# Patient Record
Sex: MALE | Race: WHITE | NOT HISPANIC OR LATINO | Employment: FULL TIME | URBAN - METROPOLITAN AREA
[De-identification: names, ages, dates, MRNs, and addresses within clinical notes are randomized per-mention and may not be internally consistent; named-entity substitution may affect disease eponyms.]

---

## 2019-03-06 ENCOUNTER — OFFICE VISIT - HEALTHEAST (OUTPATIENT)
Dept: FAMILY MEDICINE | Facility: CLINIC | Age: 44
End: 2019-03-06

## 2019-03-06 DIAGNOSIS — Z00.00 ROUTINE HISTORY AND PHYSICAL EXAMINATION OF ADULT: ICD-10-CM

## 2019-03-06 DIAGNOSIS — R40.1 DAZED STATE: ICD-10-CM

## 2019-03-06 DIAGNOSIS — M79.672 LEFT FOOT PAIN: ICD-10-CM

## 2019-03-06 LAB
ALBUMIN SERPL-MCNC: 4.4 G/DL (ref 3.5–5)
ALP SERPL-CCNC: 44 U/L (ref 45–120)
ALT SERPL W P-5'-P-CCNC: 34 U/L (ref 0–45)
ANION GAP SERPL CALCULATED.3IONS-SCNC: 8 MMOL/L (ref 5–18)
AST SERPL W P-5'-P-CCNC: 20 U/L (ref 0–40)
BASOPHILS # BLD AUTO: 0.1 THOU/UL (ref 0–0.2)
BASOPHILS NFR BLD AUTO: 1 % (ref 0–2)
BILIRUB SERPL-MCNC: 0.7 MG/DL (ref 0–1)
BUN SERPL-MCNC: 13 MG/DL (ref 8–22)
CALCIUM SERPL-MCNC: 10 MG/DL (ref 8.5–10.5)
CHLORIDE BLD-SCNC: 103 MMOL/L (ref 98–107)
CHOLEST SERPL-MCNC: 211 MG/DL
CO2 SERPL-SCNC: 29 MMOL/L (ref 22–31)
CREAT SERPL-MCNC: 1.08 MG/DL (ref 0.7–1.3)
EOSINOPHIL # BLD AUTO: 0.2 THOU/UL (ref 0–0.4)
EOSINOPHIL NFR BLD AUTO: 3 % (ref 0–6)
ERYTHROCYTE [DISTWIDTH] IN BLOOD BY AUTOMATED COUNT: 11 % (ref 11–14.5)
FASTING STATUS PATIENT QL REPORTED: NO
GFR SERPL CREATININE-BSD FRML MDRD: >60 ML/MIN/1.73M2
GLUCOSE BLD-MCNC: 89 MG/DL (ref 70–125)
HCT VFR BLD AUTO: 47.5 % (ref 40–54)
HDLC SERPL-MCNC: 45 MG/DL
HGB BLD-MCNC: 16.6 G/DL (ref 14–18)
LDLC SERPL CALC-MCNC: 138 MG/DL
LYMPHOCYTES # BLD AUTO: 2.7 THOU/UL (ref 0.8–4.4)
LYMPHOCYTES NFR BLD AUTO: 32 % (ref 20–40)
MCH RBC QN AUTO: 31.6 PG (ref 27–34)
MCHC RBC AUTO-ENTMCNC: 35 G/DL (ref 32–36)
MCV RBC AUTO: 90 FL (ref 80–100)
MONOCYTES # BLD AUTO: 0.4 THOU/UL (ref 0–0.9)
MONOCYTES NFR BLD AUTO: 5 % (ref 2–10)
NEUTROPHILS # BLD AUTO: 5 THOU/UL (ref 2–7.7)
NEUTROPHILS NFR BLD AUTO: 59 % (ref 50–70)
PLATELET # BLD AUTO: 313 THOU/UL (ref 140–440)
PMV BLD AUTO: 8.1 FL (ref 7–10)
POTASSIUM BLD-SCNC: 4.8 MMOL/L (ref 3.5–5)
PROT SERPL-MCNC: 7.3 G/DL (ref 6–8)
RBC # BLD AUTO: 5.25 MILL/UL (ref 4.4–6.2)
SODIUM SERPL-SCNC: 140 MMOL/L (ref 136–145)
TRIGL SERPL-MCNC: 141 MG/DL
WBC: 8.5 THOU/UL (ref 4–11)

## 2019-03-06 ASSESSMENT — MIFFLIN-ST. JEOR: SCORE: 1863.29

## 2019-03-11 ENCOUNTER — COMMUNICATION - HEALTHEAST (OUTPATIENT)
Dept: FAMILY MEDICINE | Facility: CLINIC | Age: 44
End: 2019-03-11

## 2020-05-21 ENCOUNTER — COMMUNICATION - HEALTHEAST (OUTPATIENT)
Dept: FAMILY MEDICINE | Facility: CLINIC | Age: 45
End: 2020-05-21

## 2020-05-21 ENCOUNTER — OFFICE VISIT - HEALTHEAST (OUTPATIENT)
Dept: FAMILY MEDICINE | Facility: CLINIC | Age: 45
End: 2020-05-21

## 2020-05-21 DIAGNOSIS — R51.9 FACIAL PAIN: ICD-10-CM

## 2020-05-21 RX ORDER — ACETAMINOPHEN 500 MG
500 TABLET ORAL EVERY 6 HOURS PRN
Status: SHIPPED | COMMUNITY
Start: 2020-05-21

## 2020-05-22 ENCOUNTER — OFFICE VISIT - HEALTHEAST (OUTPATIENT)
Dept: FAMILY MEDICINE | Facility: CLINIC | Age: 45
End: 2020-05-22

## 2020-05-22 DIAGNOSIS — R53.83 FATIGUE, UNSPECIFIED TYPE: ICD-10-CM

## 2020-05-22 DIAGNOSIS — J02.0 STREP THROAT: ICD-10-CM

## 2020-05-22 DIAGNOSIS — R59.1 LYMPHADENOPATHY: ICD-10-CM

## 2020-05-22 LAB
DEPRECATED S PYO AG THROAT QL EIA: ABNORMAL
MONOCYTES NFR BLD AUTO: NEGATIVE %

## 2020-05-22 ASSESSMENT — MIFFLIN-ST. JEOR: SCORE: 1905.02

## 2020-06-11 ENCOUNTER — COMMUNICATION - HEALTHEAST (OUTPATIENT)
Dept: SCHEDULING | Facility: CLINIC | Age: 45
End: 2020-06-11

## 2020-06-12 ENCOUNTER — OFFICE VISIT - HEALTHEAST (OUTPATIENT)
Dept: FAMILY MEDICINE | Facility: CLINIC | Age: 45
End: 2020-06-12

## 2020-06-12 DIAGNOSIS — R59.9 SWOLLEN LYMPH NODES: ICD-10-CM

## 2020-06-12 DIAGNOSIS — Z87.09 HISTORY OF STREP SORE THROAT: ICD-10-CM

## 2020-06-12 LAB — DEPRECATED S PYO AG THROAT QL EIA: NORMAL

## 2020-06-12 ASSESSMENT — MIFFLIN-ST. JEOR: SCORE: 1886.87

## 2020-06-13 LAB — GROUP A STREP BY PCR: NORMAL

## 2020-07-22 ENCOUNTER — OFFICE VISIT - HEALTHEAST (OUTPATIENT)
Dept: OTOLARYNGOLOGY | Facility: CLINIC | Age: 45
End: 2020-07-22

## 2020-07-22 DIAGNOSIS — K21.9 GASTROESOPHAGEAL REFLUX DISEASE WITHOUT ESOPHAGITIS: ICD-10-CM

## 2020-07-22 DIAGNOSIS — M26.623 BILATERAL TEMPOROMANDIBULAR JOINT PAIN: ICD-10-CM

## 2020-12-17 ENCOUNTER — COMMUNICATION - HEALTHEAST (OUTPATIENT)
Dept: SCHEDULING | Facility: CLINIC | Age: 45
End: 2020-12-17

## 2020-12-21 ENCOUNTER — OFFICE VISIT - HEALTHEAST (OUTPATIENT)
Dept: FAMILY MEDICINE | Facility: CLINIC | Age: 45
End: 2020-12-21

## 2020-12-21 DIAGNOSIS — R07.89 CHEST PRESSURE: ICD-10-CM

## 2020-12-21 DIAGNOSIS — Z13.220 LIPID SCREENING: ICD-10-CM

## 2020-12-21 DIAGNOSIS — Z13.1 SCREENING FOR DIABETES MELLITUS: ICD-10-CM

## 2020-12-21 DIAGNOSIS — R10.32 LLQ ABDOMINAL PAIN: ICD-10-CM

## 2020-12-21 DIAGNOSIS — R00.2 PALPITATIONS: ICD-10-CM

## 2020-12-21 DIAGNOSIS — K92.1 BLOOD IN STOOL: ICD-10-CM

## 2020-12-21 LAB
ALBUMIN SERPL-MCNC: 4.2 G/DL (ref 3.5–5)
ALP SERPL-CCNC: 42 U/L (ref 45–120)
ALT SERPL W P-5'-P-CCNC: 38 U/L (ref 0–45)
ANION GAP SERPL CALCULATED.3IONS-SCNC: 11 MMOL/L (ref 5–18)
AST SERPL W P-5'-P-CCNC: 20 U/L (ref 0–40)
BILIRUB SERPL-MCNC: 0.6 MG/DL (ref 0–1)
BUN SERPL-MCNC: 12 MG/DL (ref 8–22)
CALCIUM SERPL-MCNC: 9 MG/DL (ref 8.5–10.5)
CHLORIDE BLD-SCNC: 105 MMOL/L (ref 98–107)
CHOLEST SERPL-MCNC: 190 MG/DL
CO2 SERPL-SCNC: 25 MMOL/L (ref 22–31)
CREAT SERPL-MCNC: 1.08 MG/DL (ref 0.7–1.3)
ERYTHROCYTE [DISTWIDTH] IN BLOOD BY AUTOMATED COUNT: 12 % (ref 11–14.5)
FASTING STATUS PATIENT QL REPORTED: NO
GFR SERPL CREATININE-BSD FRML MDRD: >60 ML/MIN/1.73M2
GLUCOSE BLD-MCNC: 107 MG/DL (ref 70–125)
HBA1C MFR BLD: 4.8 %
HCT VFR BLD AUTO: 47.7 % (ref 40–54)
HDLC SERPL-MCNC: 41 MG/DL
HGB BLD-MCNC: 16.5 G/DL (ref 14–18)
LDLC SERPL CALC-MCNC: 128 MG/DL
MCH RBC QN AUTO: 32.3 PG (ref 27–34)
MCHC RBC AUTO-ENTMCNC: 34.6 G/DL (ref 32–36)
MCV RBC AUTO: 93 FL (ref 80–100)
PLATELET # BLD AUTO: 293 THOU/UL (ref 140–440)
PMV BLD AUTO: 8.3 FL (ref 7–10)
POTASSIUM BLD-SCNC: 4.6 MMOL/L (ref 3.5–5)
PROT SERPL-MCNC: 6.8 G/DL (ref 6–8)
RBC # BLD AUTO: 5.11 MILL/UL (ref 4.4–6.2)
SODIUM SERPL-SCNC: 141 MMOL/L (ref 136–145)
TRIGL SERPL-MCNC: 105 MG/DL
TSH SERPL DL<=0.005 MIU/L-ACNC: 0.7 UIU/ML (ref 0.3–5)
WBC: 6.8 THOU/UL (ref 4–11)

## 2020-12-24 ENCOUNTER — COMMUNICATION - HEALTHEAST (OUTPATIENT)
Dept: FAMILY MEDICINE | Facility: CLINIC | Age: 45
End: 2020-12-24

## 2020-12-28 ENCOUNTER — COMMUNICATION - HEALTHEAST (OUTPATIENT)
Dept: FAMILY MEDICINE | Facility: CLINIC | Age: 45
End: 2020-12-28

## 2021-01-11 ENCOUNTER — AMBULATORY - HEALTHEAST (OUTPATIENT)
Dept: LAB | Facility: CLINIC | Age: 46
End: 2021-01-11

## 2021-01-11 DIAGNOSIS — K92.1 BLOOD IN STOOL: ICD-10-CM

## 2021-01-11 LAB
HEMOCCULT SP1 STL QL: NEGATIVE
HEMOCCULT SP2 STL QL: NEGATIVE
HEMOCCULT SP3 STL QL: NEGATIVE

## 2021-01-12 ENCOUNTER — COMMUNICATION - HEALTHEAST (OUTPATIENT)
Dept: FAMILY MEDICINE | Facility: CLINIC | Age: 46
End: 2021-01-12

## 2021-01-13 ENCOUNTER — COMMUNICATION - HEALTHEAST (OUTPATIENT)
Dept: ADMINISTRATIVE | Facility: CLINIC | Age: 46
End: 2021-01-13

## 2021-01-25 ENCOUNTER — OFFICE VISIT - HEALTHEAST (OUTPATIENT)
Dept: FAMILY MEDICINE | Facility: CLINIC | Age: 46
End: 2021-01-25

## 2021-01-25 DIAGNOSIS — M79.10 MUSCLE PAIN: ICD-10-CM

## 2021-01-25 DIAGNOSIS — R10.9 LEFT FLANK PAIN: ICD-10-CM

## 2021-01-25 ASSESSMENT — MIFFLIN-ST. JEOR: SCORE: 1894.58

## 2021-06-02 ENCOUNTER — RECORDS - HEALTHEAST (OUTPATIENT)
Dept: ADMINISTRATIVE | Facility: CLINIC | Age: 46
End: 2021-06-02

## 2021-06-02 VITALS — HEIGHT: 71 IN | BODY MASS INDEX: 29.82 KG/M2 | WEIGHT: 213 LBS

## 2021-06-04 VITALS
HEART RATE: 64 BPM | HEIGHT: 72 IN | SYSTOLIC BLOOD PRESSURE: 140 MMHG | DIASTOLIC BLOOD PRESSURE: 82 MMHG | WEIGHT: 218.7 LBS | BODY MASS INDEX: 29.62 KG/M2

## 2021-06-04 VITALS
BODY MASS INDEX: 29.08 KG/M2 | WEIGHT: 214.7 LBS | HEIGHT: 72 IN | SYSTOLIC BLOOD PRESSURE: 134 MMHG | RESPIRATION RATE: 12 BRPM | DIASTOLIC BLOOD PRESSURE: 94 MMHG | TEMPERATURE: 98.5 F | HEART RATE: 68 BPM

## 2021-06-04 VITALS — BODY MASS INDEX: 30.27 KG/M2 | WEIGHT: 217 LBS

## 2021-06-05 VITALS
WEIGHT: 216.4 LBS | HEIGHT: 72 IN | HEART RATE: 78 BPM | BODY MASS INDEX: 29.31 KG/M2 | SYSTOLIC BLOOD PRESSURE: 122 MMHG | OXYGEN SATURATION: 97 % | DIASTOLIC BLOOD PRESSURE: 80 MMHG

## 2021-06-05 VITALS
DIASTOLIC BLOOD PRESSURE: 89 MMHG | RESPIRATION RATE: 20 BRPM | SYSTOLIC BLOOD PRESSURE: 137 MMHG | HEART RATE: 98 BPM | BODY MASS INDEX: 30.31 KG/M2 | WEIGHT: 223.5 LBS

## 2021-06-08 NOTE — PROGRESS NOTES
Assessment / Impression     1. Swollen lymph nodes  Rapid Strep A Screen- Throat Swab    Group A Strep, RNA Direct Detection, Throat    cefdinir (OMNICEF) 300 MG capsule   2. History of strep sore throat           Plan:     We checked a rapid strep test which was negative.  This will be sent for culture given his ongoing symptoms of swollen lymph nodes with a sore throat recommended treatment with cefdinir.  He will work on getting plenty of rest.  He will return to the clinic if symptoms do not resolve.    Subjective:      HPI: Osei Rain is a 45 y.o. male who presents to the clinic to be evaluated for ongoing symptoms of swollen lymph nodes.  He was initially seen for this on 5/22/2020 and his strep test results came back positive.  The mono test was negative.  He was treated with a 10-day course of Augmentin which he reports completing.  He also bought a new toothbrush.  He states that the lymph nodes never completely improved.  He still has occasional sore throat.  He denies having difficulty swallowing.  He denies having fevers, although he feels warm at times.  He denies feeling congested or having coughing symptoms.      Review of Systems  Pertinent items are noted in HPI.       Objective:     BP (!) 134/94 (Patient Site: Right Arm, Patient Position: Sitting, Cuff Size: Adult Regular)   Pulse 68   Temp 98.5  F (36.9  C) (Oral)   Resp 12   Ht 6' (1.829 m)   Wt 214 lb 11.2 oz (97.4 kg)   BMI 29.12 kg/m      General Appearance: Alert, cooperative, appears slightly fatigued.  Head: Normocephalic, without obvious abnormality, atraumatic.  Eyes: PERRL, conjunctiva/corneas clear, EOM's intact.  Throat: Slightly erythematous. No exudates.  Neck: Supple, symmetrical, trachea midline.  There are palpable lymph nodes that are mildly tender to palpation in the bilateral anterior chain.  Lungs: Clear to auscultation bilaterally, respirations unlabored.  Heart:: Regular rate and rhythm.  Extremities: Extremities  normal, atraumatic, no cyanosis or edema.        Recent Results (from the past 168 hour(s))   Rapid Strep A Screen- Throat Swab    Specimen: Throat   Result Value Ref Range    Rapid Strep A Antigen No Group A Strep detected, presumptive negative No Group A Strep detected, presumptive negative

## 2021-06-08 NOTE — TELEPHONE ENCOUNTER
Who is calling:  patient  Reason for Call:  Had a virtual visit today and was waiting on a call back so he can schedule a face to face appointment. Not sure if he was to come in today or at a later time, please advise.  Date of last appointment with primary care: 03/06/19  Okay to leave a detailed message: Yes

## 2021-06-08 NOTE — TELEPHONE ENCOUNTER
COVID 19 Nurse Triage Plan/Patient Instructions    Please be aware that novel coronavirus (COVID-19) may be circulating in the community. If you develop symptoms such as fever, cough, or SOB or if you have concerns about the presence of another infection including coronavirus (COVID-19), please contact your health care provider or visit [email or web address].     Disposition/Instructions    Patient to schedule an In Person Visit with provider. Reference Visit Selection Guide.    Thank you for taking steps to prevent the spread of this virus.  o Limit your contact with others.  o Wear a simple mask to cover your cough.  o Wash your hands well and often.    Resources    [facility]: About COVID-19: [email or web address]    CDC: What to Do If You're Sick: www.cdc.gov/coronavirus/2019-ncov/about/steps-when-sick.html    CDC: Ending Home Isolation: www.cdc.gov/coronavirus/2019-ncov/hcp/disposition-in-home-patients.html     CDC: Caring for Someone: www.cdc.gov/coronavirus/2019-ncov/if-you-are-sick/care-for-someone.html     [facility]: Interim Guidance for Hospital Discharge to Home: www.[email or web address]/coronavirus/hcp/hospdischarge.pdf    [facility] clinical trials (COVID-19 research studies): [email or web address]-clinical-trials     Below are the COVID-19 hotlines at the [place] [facility] ([facility]). Interpreters are available.   o For health questions: Call 935-516-7817 or 1-892.783.4807 (7 a.m. to 7 p.m.)  o For questions about schools and childcare: Call 833-002-4834 or 1-184.816.1033 (7 a.m. to 7 p.m.)   RN triage   Call from pt   Pt states he was seen a couple of weeks ago -- swollen nodes -- and was dx w/ strep throat -- pt states he did not have sore throat   Pt states he completed medication   Pt states he still has swollen neck nodes and they still hurt -- and sometimes has ear pain off and on   No sore throat  No other nodes swollen per pt --   No fever   Swallowing and  breathing OK   Pt does not know if nodes are red   Pt states nodes vary in size and which side is swollen l L vs R / both]   Reviewed home care advice   Transferred to    Pt requesting in person visit   Christie Esparza RN [facility] RN triage      Reason for Disposition    Patient wants to be seen    Large node present > 2 weeks    Additional Information    Negative: Sounds like a life-threatening emergency to the triager    Negative: Sore throat is the main symptom and has swollen node in the neck that is < 1 inch (2.5 cm) in size    Negative: Node is in the neck and causes difficulty breathing    Negative: Patient sounds very sick or weak to the triager    Negative: Node is in the neck and can't swallow fluids    Negative: Fever > 103 F (39.4 C)    Negative: Lump or swelling in groin and pulsating (like heartbeat)    Protocols used: LYMPH NODES - [id number]

## 2021-06-08 NOTE — PROGRESS NOTES
"Osei Rain is a 45 y.o. male who is being evaluated via a billable video visit.      The patient has been notified of following:     \"This video visit will be conducted via a call between you and your physician/provider. We have found that certain health care needs can be provided without the need for an in-person physical exam.  This service lets us provide the care you need with a video conversation.  If a prescription is necessary we can send it directly to your pharmacy.  If lab work is needed we can place an order for that and you can then stop by our lab to have the test done at a later time.    Video visits are billed at different rates depending on your insurance coverage. Please reach out to your insurance provider with any questions.    If during the course of the call the physician/provider feels a video visit is not appropriate, you will not be charged for this service.\"    Patient has given verbal consent to a Video visit? Yes    Patient would like to receive their AVS by AVS Preference: Mail a copy.    Patient would like the video invitation sent by: Send to e-mail at: [email or web address]    Will anyone else be joining your video visit? No        Video Start Time: 8:30 AM    Assessment/Plan:        1. Facial pain  Exam findings were discussed and unable to fully examine via the video.  Patient is advised for face-to-face evaluation    Patient understanding and agreeable to plan of care        Subjective:    Patient ID:   Osei Rain is a 45 y.o. male video calling and presenting with having left-sided jaw/facial pain progressively worsening over the past 5 days.  He states that it feels as if there is something inside the mouth or behind the tongue somewhere.    He denies any fevers chills, sore throat or having any ear pain    Review of Systems  Allergy: reviewed  General : negative  A complete 5 point review of systems was obtained and is negative other than what is stated in the HPI.     The " following patient's history were reviewed and updated as appropriate:   He  has no past medical history on file..      Outpatient Encounter Medications as of 5/21/2020   Medication Sig Dispense Refill     acetaminophen (TYLENOL) 500 MG tablet Take 500 mg by mouth every 6 (six) hours as needed for pain.       No facility-administered encounter medications on file as of 5/21/2020.          Objective:   Wt 217 lb (98.4 kg) Comment: pt reported  BMI 30.27 kg/m        Physical Exam  General exam: Well-hydrated in no apparent distress  Facial exam normal to view with no apparent asymmetry  No facial skin rash or apparent swelling      Video-Visit Details    Type of service:  Video Visit    Video End Time (time video stopped): 8:41 AM  Originating Location (pt. Location): Home    Distant Location (provider location):  [facility]     Platform used for Video Visit: Deacon Schultz MD

## 2021-06-09 NOTE — PROGRESS NOTES
"HISTORY OF PRESENT ILLNESS  Asked to see by Dr. Schultz for evaluation of ear pain. Patient reports that he is here to inquire about \"things that I am feeling.\" Has pain in both jaws that goes into the ear. Left is worse than the right. For a while he felt that his lymph nodes might be contributing to the problems. He had several courses of antibiotics but the pain in th ear didn't go away. He also feels a sensation that is hard to explain. He has a sensation that is an irritant in the back of the throat. His symptoms seems to worsen with prolonged speaking. He has a metallic taste in the mouth that seems to be worse on the left side than the right side. He reports that the dentist explained that his neck seemed full. Symptoms seem to come and go. Also using Tums regularly to help with reflux.     REVIEW OF SYSTEMS  Review of Systems: a 10-system review was performed. Pertinent positives are noted in the HPI and on a separate scanned document in the chart.    PMH, PSH, FH and SH has documented in the EHR.      EXAM    CONSTITUTIONAL  General Appearance:  Normal, well developed, well nourished, no obvious distress  Ability to Communicate:  communicates appropriately.  TMJ: Pain to palpation bilateral TMJs with crepitance in right.    HEAD AND FACE  Appearance and Symmetry:  Normal, no scalp or facial scarring or suspicious lesions.    EARS  Clinical speech reception threshold:  Normal, able to hear normal speech.  Auricle:  Normal, Auricles without scars, lesions, masses.  External auditory canal:  Normal, External auditory canal normal.  Tympanic membrane:  Normal, Tympanic membranes normal without swelling or erythema.    NOSE (speculum or scope)  Architecture:  Normal, Grossly normal external nasal architecture with no masses or lesions.  Mucosa:  Normal mucosa, No polyps or masses.  Septum:  Normal, Septum non-obstructing.  Turbinates:  Normal, No turbinate abnormalities    ORAL CAVITY AND OROPHARYNX  Lips:  " Normal.  Dental and gingiva:  Normal, No obvious dental or gingival disease.  Mucosa:  Normal, Moist mucous membranes.  Tongue:  Normal, Tongue mobile with no mucosal abnormalities  Hard and soft palate:  Normal, Hard and soft palate without cleft or mucosal lesions.  Oral pharynx:  Normal, Posterior pharynx without lesions or remarkable asymmetry.  Saliva:  Normal, Clear saliva.  Masses:  Normal, No palpable masses or pathologically enlarged lymph nodes.    NECK  Masses/lymph nodes:  Normal, No worrisome neck masses or lymph nodes.  Salivary glands:  Normal, Parotid and submandibular glands.  Trachea and larynx position:  Normal, Trachea and larynx midline.  Thyroid:  Normal, No thyroid abnormality.  Tenderness:  Normal, No cervical tenderness.  Suppleness:  Normal, Neck supple    NEUROLOGICAL  Speech pattern:  Normal, Proasaic    RESPIRATORY  Symmetry and Respiratory effort:  Normal, Symmetric chest movement and expansion with no increased intercostal retractions or use of accessory muscles.     IMPRESSION  1. TMJ arthralgia  2. GERD by history.    RECOMMENDATION  1. Conservative management with NSAIDS, heating pad and massage, which he has been doing and it helps.  2. Consider omeprazole.    Rolly Brar MD

## 2021-06-13 NOTE — PROGRESS NOTES
Progress Note     ASSESSMENT/PLAN:    1. Chest pressure  Thyroid Stimulating Hormone (TSH)   2. Palpitations  Thyroid Stimulating Hormone (TSH)   3. LLQ abdominal pain  Comprehensive Metabolic Panel    XR Abdomen AP    XR Abdomen AP   4. Blood in stool  HM2(CBC w/o Differential)    Occult Blood, Fecal   5. Lipid screening  Lipid Profile   6. Screening for diabetes mellitus  Glycosylated Hemoglobin A1c       Chest pressure  Palpitations  LLQ abdominal pain  Blood in stool  On exam, no red flag sxs. His vitals are reassuring in the office and he looks well. No hemorrhoids identified on exam. No colonoscopy. Patient did have initial low grade fever x 24 hours but no repeat. Unclear exact etiology of the pain. Ddx: UTI vs nephrolithiasis vs diverticulosis vs diverticulitis vs internal hemorrhoids vs constipation vs ischemic pain( less likely).. Give no fevers, less likely bacterial. No urinary sxs or hematuria making renal stones or UTI less likely. Patient does have palpitations and left sided chest pain with his sxs. Will get TSH. Chest pain most likely 2/2 GERD - stress test in 2013 was normal per patient. Will get CMP, CBC , FOBT and KUB. If no significant constipation on KUB and persistent sxs, would get CT A/P. No colonsocopy on file.   - Thyroid Stimulating Hormone (TSH)  - Comprehensive Metabolic Panel  - XR Abdomen AP  - HM2(CBC w/o Differential)  - Occult Blood, Fecal; Future    Lipid screening  - Lipid Profile    Screening for diabetes mellitus  - Glycosylated Hemoglobin A1c      There are no discontinued medications.        Return in about 1 month (around 1/21/2021) for Belly pain .    CHIEF COMPLAINT:  Chief Complaint   Patient presents with     Flank Pain     left abdominal from the front to the back then up to the chest, intermitten pain, x couple weeks, pt did test for COVID was negative        HISTORY OF PRESENT ILLNESS:  Osei Rain is a 45 y.o. male w/ no significant PMHx  Who presents today for  persistent LLQ pain:     Patient is coming in for intermittent LLQ that started about 6 weeks ago .Initially, patient had a low grade fevers of about a 100 x 24 hours when it started . The subjective fevers went away but the LLQ pain stayed. The pain comes and go. Is achy, no necessarily a sharp stabbing pain. No urinary sxs, flank pain or gross hematuria. No significant weight loss or changes in bowel habits. Sometime the belly pain will wake him up at night and feel achy. Is constipated at baseline and has continued to be so. No bloody bowl movement. (+) small amount of blood on toilet paper when he wipes. Has been eating more granola and 'bland diet' to see if it will help the belly pain - it hasn't helped. Reflux sxs have been worse - increased pain with eating. Has some mild associated left sided chest pain with no shortness of breath or orthopnea. Has Prilosec at home. No hx of hemorrhoids and no increased flatulence. No nausea or vomiting with the abdominal pain. No new supplements or changes in meds. Patient does report intermittent palpitations noted on his smart watch that last about 5 mins Mild lightheadedness with the palpitations       REVIEW OF SYSTEMS:   All other systems are negative.      TOBACCO USE:  Social History     Tobacco Use   Smoking Status Former Smoker     Types: Cigarettes   Smokeless Tobacco Never Used   Tobacco Comment    quit smoking about 2016; quit chewing tobacco about 1990; vaped for about 1 year       MEDICATIONS:  Current Outpatient Medications   Medication Sig Dispense Refill     acetaminophen (TYLENOL) 500 MG tablet Take 500 mg by mouth every 6 (six) hours as needed for pain.       No current facility-administered medications for this visit.          Immunization History   Administered Date(s) Administered     Tdap 07/13/2007, 12/23/2010, 12/03/2013         VITALS:  Vitals:    12/21/20 1023   BP: 137/89   Patient Site: Right Arm   Patient Position: Sitting   Cuff Size: Adult  Regular   Pulse: 98   Resp: 20   Weight: (!) 223 lb 8 oz (101.4 kg)     Wt Readings from Last 3 Encounters:   12/21/20 (!) 223 lb 8 oz (101.4 kg)   06/12/20 214 lb 11.2 oz (97.4 kg)   05/22/20 218 lb 11.2 oz (99.2 kg)     Body mass index is 30.31 kg/m .      PHYSICAL EXAM:  Constitutional:  Reveals pleasant, obese male, NAD.  Vitals:  Per nursing notes.  Neck:  Supple, thyroid not palpable.  Cardiac:  Regular rate and rhythm without murmurs, rubs, or gallops. Legs without edema. Palpation of the radial pulse regular.  Lungs: Clear.  Respiratory effort normal.  Abdomen:   Bowel sounds hypoactive, distension 2/2 body habitus, no significant pain with deep palpation of the LLQ, no rebound or guarding. No masses palpated. No overlying skin changes. Patient has no perianal irritation or hemorrhoids.   Neither liver nor spleen palpable.  Skin:   Without rash, bruise, or palpable lesions.  Psychiatric:  Mood appropriate, memory intact.

## 2021-06-13 NOTE — TELEPHONE ENCOUNTER
Osei reports pain on his lower left side, back. On and off, happening daily for the past 6 weeks. Pain started 11/9.    Has changed his diet and pain still the same  Pain varies in degrees. Constant pain. Pain shoots up randomly, mostly notices when he's not doing anything.  Pain is tolerable, mild. Able to perform usual activities.    No concerns with BM and urinating.    Reports that he was exposed to COVID (wife tested positive), 14 day quarantine completed on 11/28. Currently does not have COVID symptoms.    PLAN:  - office visit in 3 days per protocol  - call back for further concerns  - go to ED if pain becomes severe  Patient verbalized understanding. Transferred to .    Meagan Harp RN/[place] Nurse Advisor          Reason for Disposition    MILD pain (i.e., scale 1-3; does not interfere with normal activities) and present > 3 days    Additional Information    Negative: Passed out (i.e., lost consciousness, collapsed and was not responding)    Negative: Shock suspected (e.g., cold/pale/clammy skin, too weak to stand, low BP, rapid pulse)    Negative: Sounds like a life-threatening emergency to the triager    Negative: SEVERE pain (e.g., excruciating, scale 8-10) and present > 1 hour    Negative: Sudden onset of severe flank pain and age > 60    Negative: Abdominal pain and age > 60    Negative: Unable to urinate (or only a few drops) > 4 hours and bladder feels very full (e.g., palpable bladder or strong urge to urinate)    Negative: Pain radiates into groin, scrotum    Negative: Blood in urine (red, pink, or tea-colored)    Negative: Vomiting    Negative: Weakness of a leg or foot (e.g., unable to bear weight, dragging foot)    Negative: Patient sounds very sick or weak to the triager    Negative: Fever > 100.5 F (38.1 C)    Negative: Pain or burning with passing urine (urination)    Negative: MODERATE pain (e.g., interferes with normal activities or awakens from sleep)    Negative: Painful  rash with multiple small blisters grouped together (i.e., dermatomal distribution or 'band' or 'stripe')    Negative: Pregnant (EXCEPTION: mild pain that is only present with movement)    Negative: Diabetes mellitus or weak immune system (e.g., HIV positive, cancer chemo, splenectomy, organ transplant, chronic steroids) (EXCEPTION: mild pain that is only present with movement)    Negative: Patient wants to be seen    Protocols used: FLANK PAIN-A-OH

## 2021-06-14 NOTE — TELEPHONE ENCOUNTER
----- Message from Ilda Villatoro DO sent at 12/22/2020  9:23 PM CST -----  Please call patient and let him know, the blood test and belly Xray didn't show any obvious abnormalities or causes of pain. I am still waiting for the fecal occult test to come back to let me know if blood has been detected in your stool. At this time, please let me know if you are still having the abdominal pain because I would then like to proceed with a CT of the abdomen which will tell me more details and highlight various other GI structures. Let me know if you're ok with me ordering it and I will do so.

## 2021-06-14 NOTE — TELEPHONE ENCOUNTER
----- Message from Ilda Villatoro DO sent at 12/28/2020 12:57 PM CST -----  Hi,     Can we call this patient a call again and ask him how he is doing     Is he stil having the belly pain? Is he still seeing blood when wiping ? If yes, I will put in for a CT abd/pelvis    Ilda Villatoro,

## 2021-06-14 NOTE — TELEPHONE ENCOUNTER
Left message for patient to call back regarding belly pain. When he calls back please gets answers to the questions below.

## 2021-06-14 NOTE — TELEPHONE ENCOUNTER
Pt would like call back with update on other lab and tests done.    Reviewed message with pt that there was no blood found in his sample.    Pt stated he is feeling better but would like to know what the other tests found.

## 2021-06-14 NOTE — TELEPHONE ENCOUNTER
Left message for pt to call back please relay message from Dr. Villatoro:     no blood in his stool on the sample submitted.     How is his pain? Is it still persistent?

## 2021-06-14 NOTE — TELEPHONE ENCOUNTER
I called and left a generic message requesting a call back. When patient calls back, please relay result and message from Dr. Villatoro.

## 2021-06-14 NOTE — TELEPHONE ENCOUNTER
----- Message from Ilda Villatoro DO sent at 1/11/2021  8:27 PM CST -----  Please call patient and let him know that there was no blood in his stool on the sample submitted.     How is his pain? Is it still persistent?

## 2021-06-14 NOTE — PROGRESS NOTES
"Progress Note     ASSESSMENT/PLAN:    1. Muscle pain  naproxen (NAPROSYN) 500 MG tablet   2. Left flank pain         Muscle pain  Left flank pain  Reviewed all the blood work with the patient.  CBC did not show any signs of infection or anemia.  No evidence of any kind of viral suppression on the CBC.  CMP was largely unremarkable making kidneys/renal stones cause of his pain unlikely.  KUB also did not  on any renal stones.  TSH was normal.  Diverticulitis/got inflammation less likely to be caused of his pain as FOBT was negative for blood.  Given that the pain today has migrated from the LLQ to the left flank but overall much improved, suspect musculoskeletal etiology.  Patient asked about it being a possible UTI-highly unlikely given no symptoms reported today.  Can do a trial of Naprosyn and encourage patient to do more stretching.  Can ice the area.  If no improvement or symptoms continue to linger, can think about getting additional imaging.  Patient amenable to that.  - naproxen (NAPROSYN) 500 MG tablet; Take 1 tablet (500 mg total) by mouth 2 (two) times a day with meals for 7 days.    Patient declined flu shot today but said that he would come back after his birthday to receive it.  He does not want him \"sick\" before the ski trip this week.      There are no discontinued medications.    RTO: As needed.  May follow-up with his PCP.      CHIEF COMPLAINT:  Chief Complaint   Patient presents with     Follow-up     Flank pain pt stated it is better. some mornings its worse.        HISTORY OF PRESENT ILLNESS:  Osei Rain is a 45 y.o. male  who presents today for LLQ pain follow-up.    Since the last time he was seen, reports that the pain has improved significantly.  Still there once in a while and feels more like a \"twinge\".  When he moves \"just the right way\", he feels a slight pulling in the area.  Overall is doing much better and has no complaints.  Still worried about why he continues to have the " pain and would like to go over all of his blood work again.  Continues to try to eat healthier.  No bloody bowel movements and no systemic symptoms reported.        REVIEW OF SYSTEMS:   All other systems are negative.      TOBACCO USE:  Social History     Tobacco Use   Smoking Status Former Smoker     Types: Cigarettes   Smokeless Tobacco Never Used   Tobacco Comment    quit smoking about 2016; quit chewing tobacco about 1990; vaped for about 1 year       MEDICATIONS:  Current Outpatient Medications   Medication Sig Dispense Refill     acetaminophen (TYLENOL) 500 MG tablet Take 500 mg by mouth every 6 (six) hours as needed for pain.       naproxen (NAPROSYN) 500 MG tablet Take 1 tablet (500 mg total) by mouth 2 (two) times a day with meals for 7 days. 14 tablet 0     No current facility-administered medications for this visit.          Immunization History   Administered Date(s) Administered     Tdap 07/13/2007, 12/23/2010, 12/03/2013         VITALS:  Vitals:    01/25/21 1003   BP: 122/80   Patient Site: Right Arm   Patient Position: Sitting   Cuff Size: Adult Regular   Pulse: 78   SpO2: 97%   Weight: 216 lb 6.4 oz (98.2 kg)   Height: 6' (1.829 m)     Wt Readings from Last 3 Encounters:   01/25/21 216 lb 6.4 oz (98.2 kg)   12/21/20 (!) 223 lb 8 oz (101.4 kg)   06/12/20 214 lb 11.2 oz (97.4 kg)     Body mass index is 29.35 kg/m .      PHYSICAL EXAM:  Constitutional:  Reveals a pleasant male, NAD.  Vitals:  Per nursing notes.  Cardiac:  Regular rate and rhythm without murmurs, rubs, or gallops. Legs without edema. Palpation of the radial pulse regular.  Lungs: Clear.  Respiratory effort normal.  Abdomen:   Bowel sounds positive, nontender, distended 2/2 body habitus.  Neither liver nor spleen palpable.  No CVA tenderness bilaterally.

## 2021-06-18 NOTE — LETTER
Letter by Cooper Schultz MD at      Author: Cooper Schultz MD Service: -- Author Type: --    Filed:  Encounter Date: 3/11/2019 Status: (Other)       Osei Rain  [address] [place] [address]             March 11, 2019         Dear Mr. Rain,    Below are the results from your recent visit:    Resulted Orders   Comprehensive Metabolic Panel   Result Value Ref Range    Sodium 140 136 - 145 mmol/L    Potassium 4.8 3.5 - 5.0 mmol/L    Chloride 103 98 - 107 mmol/L    CO2 29 22 - 31 mmol/L    Anion Gap, Calculation 8 5 - 18 mmol/L    Glucose 89 70 - 125 mg/dL    BUN 13 8 - 22 mg/dL    Creatinine 1.08 0.70 - 1.30 mg/dL    GFR MDRD Af Amer >60 >60 mL/min/1.73m2    GFR MDRD Non Af Amer >60 >60 mL/min/1.73m2    Bilirubin, Total 0.7 0.0 - 1.0 mg/dL    Calcium 10.0 8.5 - 10.5 mg/dL    Protein, Total 7.3 6.0 - 8.0 g/dL    Albumin 4.4 3.5 - 5.0 g/dL    Alkaline Phosphatase 44 (L) 45 - 120 U/L    AST 20 0 - 40 U/L    ALT 34 0 - 45 U/L    Narrative    Fasting Glucose reference range is 70-99 mg/dL per  American Diabetes Association (ADA) guidelines.   Lipid Profile   Result Value Ref Range    Triglycerides 141 <=149 mg/dL    Cholesterol 211 (H) <=199 mg/dL    LDL Calculated 138 (H) <=129 mg/dL    HDL Cholesterol 45 >=40 mg/dL    Patient Fasting > 8hrs? No    HM1 (CBC with Diff)   Result Value Ref Range    WBC 8.5 4.0 - 11.0 thou/uL    RBC 5.25 4.40 - 6.20 mill/uL    Hemoglobin 16.6 14.0 - 18.0 g/dL    Hematocrit 47.5 40.0 - 54.0 %    MCV 90 80 - 100 fL    MCH 31.6 27.0 - 34.0 pg    MCHC 35.0 32.0 - 36.0 g/dL    RDW 11.0 11.0 - 14.5 %    Platelets 313 140 - 440 thou/uL    MPV 8.1 7.0 - 10.0 fL    Neutrophils % 59 50 - 70 %    Lymphocytes % 32 20 - 40 %    Monocytes % 5 2 - 10 %    Eosinophils % 3 0 - 6 %    Basophils % 1 0 - 2 %    Neutrophils Absolute 5.0 2.0 - 7.7 thou/uL    Lymphocytes Absolute 2.7 0.8 - 4.4 thou/uL    Monocytes Absolute 0.4 0.0 - 0.9 thou/uL    Eosinophils Absolute 0.2 0.0 - 0.4  thou/uL    Basophils Absolute 0.1 0.0 - 0.2 thou/uL            Elevated cholesterol and LDL.    Make an attempt to improve diet, cut back on the carbs and fats,  and exercise more  efficiently to aid in this problem.    Recheck in 6-12 months.         Please call with questions or contact us using [email or web address].    Sincerely,        Electronically signed by Cooper Schultz MD

## 2021-06-24 NOTE — PROGRESS NOTES
Assessment/Plan:        1. Routine history and physical examination of adult  - Comprehensive Metabolic Panel  - HM1(CBC and Differential)  - Lipid Profile  - HM1 (CBC with Diff)    2. Dazed state  Unclear etiology,   See above lab for further eval.   Consider eye strain while at the computer.  Recommended an eye eval with the ophthalmology    3. Left foot pain  Consider lateral ankle / peroneal tendonitis.   tx options were reviewed ie. Wearing a high top shoe or considering a boot splint.   Follow up prn     An additional 25  minutes spent on this visit with more than 50% time spent on   reviewing medical record, education and discussion of the above chronic medical problems,  and discussion of the lab results.    ______________________________________________________________________     Osei Rain is a 44 y.o. male coming in today for a routine physical.    Other issues to discuss and evaluate today:    1- having Sharp pains in the outer aspect of the left foot, radiating up to the ankle and lower leg with movement, gradually worsening in the past 2 months.   Patient denies an injury.      2- noting having a tunnel vision at random, settling down on its own. He feels dazed, and blurry  at times.  He notes the symptoms aggravated with some nausea feeling while at the computer looking at the screen.       History reviewed. No pertinent past medical history.    Past Surgical History:   Procedure Laterality Date     KNEE ARTHROSCOPY Left     dislocated the knee cap several times         Family History   Problem Relation Age of Onset     COPD Mother      No Medical Problems Father      No Medical Problems Brother        Social History     Socioeconomic History     Marital status:      Spouse name: None     Number of children: None     Years of education: None     Highest education level: None   Occupational History     None   Social Needs     Financial resource strain: None     Food insecurity:     Worry:  "None     Inability: None     Transportation needs:     Medical: None     Non-medical: None   Tobacco Use     Smoking status: Former Smoker     Types: Cigarettes     Smokeless tobacco: Never Used   Substance and Sexual Activity     Alcohol use: No     Frequency: Never     Drug use: Yes     Types: Marijuana     Sexual activity: None   Lifestyle     Physical activity:     Days per week: None     Minutes per session: None     Stress: None   Relationships     Social connections:     Talks on phone: None     Gets together: None     Attends [employer] service: None     Active member of club or organization: None     Attends meetings of clubs or organizations: None     Relationship status: None     Intimate partner violence:     Fear of current or ex partner: None     Emotionally abused: None     Physically abused: None     Forced sexual activity: None   Other Topics Concern     None   Social History Narrative     None        No current outpatient medications on file.       Immunization History   Administered Date(s) Administered     Tdap 12/23/2010, 12/03/2013      ______________________________________________________________________     ROS:  A complete 10 point review of systems was obtained and is negative other than what is stated in the HPI.    ______________________________________________________________________     Exam:    Wt Readings from Last 3 Encounters:   03/06/19 213 lb (96.6 kg)     BP Readings from Last 3 Encounters:   03/06/19 116/76     /76 (Patient Site: Right Arm, Patient Position: Semi-pierce, Cuff Size: Adult Large)   Pulse 77   Ht 5' 11\" (1.803 m)   Wt 213 lb (96.6 kg)   SpO2 96%   BMI 29.71 kg/m       General appearance - alert, well appearing, and in no distress  Eyes - pupils equal and reactive, extraocular eye movements intact  ENT - bilateral TM's and external ear canals normal,  mucous membranes moist, pharynx normal without lesions  Neck - supple, no adenopathy, normal thyroid "   Chest - clear to auscultation, no wheezes, rales or rhonchi, symmetric air entry  Heart - normal rate, regular rhythm, normal S1, S2, no murmurs, rubs, clicks or gallops  Abdomen - soft, nontender, nondistended, no masses or organomegaly   Male - no penile lesions or discharge, no testicular masses or tenderness, no hernias  Back exam - full range of motion, no tenderness, palpable spasm or pain on motion  Neurological - Grossly intact, alert, oriented, normal speech, no focal findings or movement disorder noted,   Musculoskeletal - no joint tenderness, deformity or swelling  Extremities/ Foot - peripheral pulses normal, no pedal edema, no clubbing or cyanosis, left foot is normal to inspection, no swelling, palpable tenderness to the head of proximal 5th metatarsal, and along the lateral ankle.   Skin - normal coloration and turgor, no rashes, no suspicious skin lesions noted  Lymphatics - no palpable lymphadenopathy

## 2021-06-24 NOTE — PROGRESS NOTES
Elevated cholesterol and LDL.   Make an attempt to improve diet, cut back on the carbs and fats,  and exercise more efficiently to aid in this problem.   Recheck in 6-12 months.

## 2022-02-16 ENCOUNTER — OFFICE VISIT (OUTPATIENT)
Dept: PEDIATRICS | Facility: CLINIC | Age: 47
End: 2022-02-16
Payer: COMMERCIAL

## 2022-02-16 VITALS
WEIGHT: 219.9 LBS | DIASTOLIC BLOOD PRESSURE: 90 MMHG | OXYGEN SATURATION: 97 % | SYSTOLIC BLOOD PRESSURE: 122 MMHG | HEART RATE: 74 BPM | RESPIRATION RATE: 16 BRPM | BODY MASS INDEX: 29.82 KG/M2

## 2022-02-16 DIAGNOSIS — M79.675 PAIN IN TOES OF BOTH FEET: ICD-10-CM

## 2022-02-16 DIAGNOSIS — Z12.11 SPECIAL SCREENING FOR MALIGNANT NEOPLASMS, COLON: ICD-10-CM

## 2022-02-16 DIAGNOSIS — M79.674 PAIN IN TOES OF BOTH FEET: ICD-10-CM

## 2022-02-16 DIAGNOSIS — G62.9 NEUROPATHY: Primary | ICD-10-CM

## 2022-02-16 LAB
ALBUMIN SERPL-MCNC: 4.4 G/DL (ref 3.5–5)
ALP SERPL-CCNC: 40 U/L (ref 45–120)
ALT SERPL W P-5'-P-CCNC: 26 U/L (ref 0–45)
ANION GAP SERPL CALCULATED.3IONS-SCNC: 11 MMOL/L (ref 5–18)
AST SERPL W P-5'-P-CCNC: 16 U/L (ref 0–40)
BASOPHILS # BLD AUTO: 0 10E3/UL (ref 0–0.2)
BASOPHILS NFR BLD AUTO: 0 %
BILIRUB SERPL-MCNC: 1.1 MG/DL (ref 0–1)
BUN SERPL-MCNC: 9 MG/DL (ref 8–22)
CALCIUM SERPL-MCNC: 9.7 MG/DL (ref 8.5–10.5)
CHLORIDE BLD-SCNC: 101 MMOL/L (ref 98–107)
CHOLEST SERPL-MCNC: 190 MG/DL
CO2 SERPL-SCNC: 25 MMOL/L (ref 22–31)
CREAT SERPL-MCNC: 1.08 MG/DL (ref 0.7–1.3)
EOSINOPHIL # BLD AUTO: 0.1 10E3/UL (ref 0–0.7)
EOSINOPHIL NFR BLD AUTO: 1 %
ERYTHROCYTE [DISTWIDTH] IN BLOOD BY AUTOMATED COUNT: 11.8 % (ref 10–15)
FASTING STATUS PATIENT QL REPORTED: NO
GFR SERPL CREATININE-BSD FRML MDRD: 85 ML/MIN/1.73M2
GLUCOSE BLD-MCNC: 87 MG/DL (ref 70–125)
HBA1C MFR BLD: 5.2 % (ref 0–5.6)
HCT VFR BLD AUTO: 44 % (ref 40–53)
HDLC SERPL-MCNC: 42 MG/DL
HGB BLD-MCNC: 15.6 G/DL (ref 13.3–17.7)
IMM GRANULOCYTES # BLD: 0 10E3/UL
IMM GRANULOCYTES NFR BLD: 0 %
LDLC SERPL CALC-MCNC: 128 MG/DL
LYMPHOCYTES # BLD AUTO: 2.1 10E3/UL (ref 0.8–5.3)
LYMPHOCYTES NFR BLD AUTO: 28 %
MCH RBC QN AUTO: 30.8 PG (ref 26.5–33)
MCHC RBC AUTO-ENTMCNC: 35.5 G/DL (ref 31.5–36.5)
MCV RBC AUTO: 87 FL (ref 78–100)
MONOCYTES # BLD AUTO: 0.4 10E3/UL (ref 0–1.3)
MONOCYTES NFR BLD AUTO: 6 %
NEUTROPHILS # BLD AUTO: 4.8 10E3/UL (ref 1.6–8.3)
NEUTROPHILS NFR BLD AUTO: 64 %
PLATELET # BLD AUTO: 310 10E3/UL (ref 150–450)
POTASSIUM BLD-SCNC: 4.2 MMOL/L (ref 3.5–5)
PROT SERPL-MCNC: 7.1 G/DL (ref 6–8)
RBC # BLD AUTO: 5.07 10E6/UL (ref 4.4–5.9)
SODIUM SERPL-SCNC: 137 MMOL/L (ref 136–145)
TRIGL SERPL-MCNC: 98 MG/DL
TSH SERPL DL<=0.005 MIU/L-ACNC: 1.39 UIU/ML (ref 0.3–5)
URATE SERPL-MCNC: 6.8 MG/DL (ref 3–8)
VIT B12 SERPL-MCNC: 365 PG/ML (ref 213–816)
WBC # BLD AUTO: 7.5 10E3/UL (ref 4–11)

## 2022-02-16 PROCEDURE — 80050 GENERAL HEALTH PANEL: CPT | Performed by: PEDIATRICS

## 2022-02-16 PROCEDURE — 99214 OFFICE O/P EST MOD 30 MIN: CPT | Performed by: PEDIATRICS

## 2022-02-16 PROCEDURE — 84165 PROTEIN E-PHORESIS SERUM: CPT | Performed by: PATHOLOGY

## 2022-02-16 PROCEDURE — 84550 ASSAY OF BLOOD/URIC ACID: CPT | Performed by: PEDIATRICS

## 2022-02-16 PROCEDURE — 36415 COLL VENOUS BLD VENIPUNCTURE: CPT | Performed by: PEDIATRICS

## 2022-02-16 PROCEDURE — 83036 HEMOGLOBIN GLYCOSYLATED A1C: CPT | Performed by: PEDIATRICS

## 2022-02-16 PROCEDURE — 80053 COMPREHEN METABOLIC PANEL: CPT | Performed by: PEDIATRICS

## 2022-02-16 PROCEDURE — 82607 VITAMIN B-12: CPT | Performed by: PEDIATRICS

## 2022-02-16 PROCEDURE — 84166 PROTEIN E-PHORESIS/URINE/CSF: CPT | Performed by: PATHOLOGY

## 2022-02-16 PROCEDURE — 86038 ANTINUCLEAR ANTIBODIES: CPT | Performed by: PEDIATRICS

## 2022-02-16 PROCEDURE — 80061 LIPID PANEL: CPT | Performed by: PEDIATRICS

## 2022-02-16 NOTE — PROGRESS NOTES
Assessment & Plan     Neuropathy  Has noticed neuropathy in both big toes and some other toes bilaterally. Will check labs for neuropathy. He is concerned about diabetes. If negative, consider EMG and/or neuro referral.  - Hemoglobin A1c; Future  - Protein electrophoresis; Future  - Protein electrophoresis random urine; Future  - Comprehensive metabolic panel (BMP + Alb, Alk Phos, ALT, AST, Total. Bili, TP); Future  - Lipid panel reflex to direct LDL Fasting; Future  - Vitamin B12; Future  - TSH with free T4 reflex; Future  - Anti Nuclear Erika IgG by IFA with Reflex; Future  - CBC with platelets and differential; Future  - Hemoglobin A1c  - Protein electrophoresis  - Comprehensive metabolic panel (BMP + Alb, Alk Phos, ALT, AST, Total. Bili, TP)  - Lipid panel reflex to direct LDL Fasting  - Vitamin B12  - TSH with free T4 reflex  - Anti Nuclear Erika IgG by IFA with Reflex  - CBC with platelets and differential  - Protein electrophoresis random urine    Pain in toes of both feet  History doesn't seem consistent with gout, but will check uric acid given big toe involvement classic for gout  - Uric acid; Future  - Uric acid    Special screening for malignant neoplasms, colon  Would like colonoscopy referral  - Adult Gastro Ref - Procedure Only; Future           Return if symptoms worsen or fail to improve, for Follow up.    Alba Mast MD  [facility]    Heriberto Franz is a 47 year old who presents for the following health issues     HPI     Concern - Big Toe Pain and numbness on both feel, and pain with stretching      Big toe on the right side, sometimes on the left side too  Couple of months  Sometimes on other toestoo  No redness or swelling    Dislocated the left knee a few times  Trying to lose weight and exercise more        Review of Systems   See above        Objective    BP (!) 122/90 (BP Location: Right arm, Patient Position: Sitting, Cuff Size: Adult Large)   Pulse 74    Resp 16   Wt 99.7 kg (219 lb 14.4 oz)   SpO2 97%   BMI 29.82 kg/m    Body mass index is 29.82 kg/m .  Physical Exam   GENERAL: healthy, alert and no distress  RESP: lungs clear to auscultation - no rales, rhonchi or wheezes  CV: regular rate and rhythm, normal S1 S2, no S3 or S4, no murmur, click or rub, no peripheral edema and peripheral pulses strong  ABDOMEN: soft, nontender, no hepatosplenomegaly, no masses and bowel sounds normal  MS: no toe abnormality, no erythema/tenderness/swelling, no gross musculoskeletal defects noted, no edema

## 2022-02-16 NOTE — LETTER
February 21, 2022      Osei ANDERSON Koffi  [address] [place] [address]        Dear ,    We are writing to inform you of your test results.    Hi Osei,    Your labs do not show any reason for the numbness in your feet. You do not have diabetes. Your cholesterol levels look good for your age. If you would like to proceed with nerve studies for the numbness/tingling in your feet, please let me know and I can order those. Otherwise, if you are interested in seeing a neurologist first, we could certainly refer you on there as well.     Please let me know if you have any questions or concerns,    Resulted Orders   Hemoglobin A1c   Result Value Ref Range    Hemoglobin A1C 5.2 0.0 - 5.6 %      Comment:      Normal <5.7%   Prediabetes 5.7-6.4%    Diabetes 6.5% or higher     Note: Adopted from ADA consensus guidelines.   Comprehensive metabolic panel (BMP + Alb, Alk Phos, ALT, AST, Total. Bili, TP)   Result Value Ref Range    Sodium 137 136 - 145 mmol/L    Potassium 4.2 3.5 - 5.0 mmol/L    Chloride 101 98 - 107 mmol/L    Carbon Dioxide (CO2) 25 22 - 31 mmol/L    Anion Gap 11 5 - 18 mmol/L    Urea Nitrogen 9 8 - 22 mg/dL    Creatinine 1.08 0.70 - 1.30 mg/dL    Calcium 9.7 8.5 - 10.5 mg/dL    Glucose 87 70 - 125 mg/dL    Alkaline Phosphatase 40 (L) 45 - 120 U/L    AST 16 0 - 40 U/L    ALT 26 0 - 45 U/L    Protein Total 7.1 6.0 - 8.0 g/dL    Albumin 4.4 3.5 - 5.0 g/dL    Bilirubin Total 1.1 (H) 0.0 - 1.0 mg/dL    GFR Estimate 85 >60 mL/min/1.73m2      Comment:      Effective December 21, 2021 eGFRcr in adults is calculated using the 2021 CKD-EPI creatinine equation which includes age and gender (Michell et al., NEJM, DOI: 10.1056/GMJMlm8144637)   Lipid panel reflex to direct LDL Fasting   Result Value Ref Range    Cholesterol 190 <=199 mg/dL    Triglycerides 98 <=149 mg/dL    Direct Measure HDL 42 >=40 mg/dL      Comment:      HDL Cholesterol Reference Range:     0-2 years:   No reference ranges established for patients  under 2 years old  [facility] for lipid analytes.    2-8 years:  Greater than 45 mg/dL     18 years and older:   Female: Greater than or equal to 50 mg/dL   Male:   Greater than or equal to 40 mg/dL    LDL Cholesterol Calculated 128 <=129 mg/dL    Patient Fasting > 8hrs? No    Vitamin B12   Result Value Ref Range    Vitamin B12 365 213 - 816 pg/mL   TSH with free T4 reflex   Result Value Ref Range    TSH 1.39 0.30 - 5.00 uIU/mL   Anti Nuclear Erika IgG by IFA with Reflex   Result Value Ref Range    LISY interpretation Negative Negative      Comment:        Negative:              <1:40  Borderline Positive:   1:40 - 1:80  Positive:              >1:80   Uric acid   Result Value Ref Range    Uric Acid 6.8 3.0 - 8.0 mg/dL   Total Protein, Serum for ELP   Result Value Ref Range    Total Protein Serum for ELP 7.1 6.0 - 8.0 g/dL   Protein Electrophoresis, Serum   Result Value Ref Range    Albumin % 69.6 (H) 51.0 - 67.0 %    Albumin 4.9 (H) 3.2 - 4.7 g/dL    Alpha 1 % 2.3 2.0 - 4.0 %    Alpha 1 0.2 0.1 - 0.3 g/dL    Alpha 2 % 7.5 5.0 - 13.0 %    Alpha 2 0.5 0.4 - 0.9 g/dL    Beta % 10.4 10.0 - 17.0 %    Beta Globulin 0.7 0.7 - 1.2 g/dL    Gamma Globulin % 10.2 9.0 - 20.0 %    Gamma Globulin 0.7 0.6 - 1.4 g/dL    ELP Interpretation       Increased albumin, which can be seen in dehydration among other disorders. Albumin levels may be elevated in dehydration, congestive heart failure, poor protein utilization, glucocorticoid excess, and congenital causes among possibilities. Clinical correlation is required.     Path ICD: G62.9     Reviewing Pathologist Jean Carlos Wright MD       Total Protein Serum for ELP 7.1 g/dL   CBC with platelets and differential   Result Value Ref Range    WBC Count 7.5 4.0 - 11.0 10e3/uL    RBC Count 5.07 4.40 - 5.90 10e6/uL    Hemoglobin 15.6 13.3 - 17.7 g/dL    Hematocrit 44.0 40.0 - 53.0 %    MCV 87 78 - 100 fL    MCH 30.8 26.5 - 33.0 pg    MCHC 35.5 31.5 - 36.5 g/dL    RDW 11.8  10.0 - 15.0 %    Platelet Count 310 150 - 450 10e3/uL    % Neutrophils 64 %    % Lymphocytes 28 %    % Monocytes 6 %    % Eosinophils 1 %    % Basophils 0 %    % Immature Granulocytes 0 %    Absolute Neutrophils 4.8 1.6 - 8.3 10e3/uL    Absolute Lymphocytes 2.1 0.8 - 5.3 10e3/uL    Absolute Monocytes 0.4 0.0 - 1.3 10e3/uL    Absolute Eosinophils 0.1 0.0 - 0.7 10e3/uL    Absolute Basophils 0.0 0.0 - 0.2 10e3/uL    Absolute Immature Granulocytes 0.0 <=0.4 10e3/uL   Protein electrophoresis random urine   Result Value Ref Range    Total Protein Random Urine 10 mg/dL    ELP Interpretation Urine Unremarkable protein electrophoresis.      Path ICD: G62.9     Reviewing Pathologist Jean Carlos Wright MD          If you have any questions or concerns, please call the clinic at the number listed above.       Sincerely,      Alba Mast MD

## 2022-02-16 NOTE — PATIENT INSTRUCTIONS
Go to the lab today. We will notify you with the results once those are available.    GENERAL INFORMATION AND HELPFUL NUMBERS:    If labs were ordered today, please go to the outpatient lab located in the same building. Once the results are back, we will notify you with results.    If imaging was ordered to be done today, please go to [facility] (located in the same building across our [facility]). Once the results are back, we will notify you with results.    If imaging was ordered to be done in the future at an [facility], someone will call to schedule otherwise you may also call [phone] to schedule.    If a specialty referral was placed during today's visit, someone will call to schedule unless you were instructed to call yourself. If you are having issues with this, please message me through Aviir or call our clinic at [phone] (press option 2 to speak with someone from our [facility] team).    If a mammogram was ordered during today's visit, someone will call to schedule otherwise you may also call [phone] to schedule     If a heart ultrasound or stress test was ordered today, someone will call to schedule otherwise you may also call the main Cardiology clinic at [phone] to schedule.    If a bone density scan was ordered today, someone will call to schedule otherwise you may also call [phone] to schedule.    If you have any questions or concerns after our visit today, please message me through Aviir or call our clinic at [phone] (press option 2 to speak with someone from our [facility] team).

## 2022-02-18 LAB
ALBUMIN PERCENT: 69.6 % (ref 51–67)
ALBUMIN SERPL ELPH-MCNC: 4.9 G/DL (ref 3.2–4.7)
ALPHA 1 PERCENT: 2.3 % (ref 2–4)
ALPHA 2 PERCENT: 7.5 % (ref 5–13)
ALPHA1 GLOB SERPL ELPH-MCNC: 0.2 G/DL (ref 0.1–0.3)
ALPHA2 GLOB SERPL ELPH-MCNC: 0.5 G/DL (ref 0.4–0.9)
ANA SER QL IF: NEGATIVE
B-GLOBULIN SERPL ELPH-MCNC: 0.7 G/DL (ref 0.7–1.2)
BETA PERCENT: 10.4 % (ref 10–17)
GAMMA GLOB SERPL ELPH-MCNC: 0.7 G/DL (ref 0.6–1.4)
GAMMA GLOBULIN PERCENT: 10.2 % (ref 9–20)
PATH ICD:: ABNORMAL
PATH ICD:: NORMAL
PROT PATTERN SERPL ELPH-IMP: ABNORMAL
PROT PATTERN UR ELPH-IMP: NORMAL
REVIEWING PATHOLOGIST: ABNORMAL
REVIEWING PATHOLOGIST: NORMAL
TOTAL PROTEIN RANDOM URINE MG/DL: 10 MG/DL
TOTAL PROTEIN SERUM FOR ELP (SYNCED VALUE): 7.1 G/DL
TOTAL PROTEIN SERUM FOR ELP: 7.1 G/DL (ref 6–8)

## 2022-02-19 NOTE — RESULT ENCOUNTER NOTE
Can you please call the patient with the following message, and let me know if he wants the nerve studies or if he would like neuro referral? Thanks. Send letter if he wants with labs attached, or if unable to reach. Thanks - Dr. Durán    ------------------------------------  Eusebio Franz,    Your labs do not show any reason for the numbness in your feet. You do not have diabetes. Your cholesterol levels look good for your age. If you would like to proceed with nerve studies for the numbness/tingling in your feet, please let me know and I can order those. Otherwise, if you are interested in seeing a neurologist first, we could certainly refer you on there as well.     Please let me know if you have any questions or concerns,  Dr. Durán

## 2022-11-04 ENCOUNTER — OFFICE VISIT (OUTPATIENT)
Dept: FAMILY MEDICINE | Facility: CLINIC | Age: 47
End: 2022-11-04
Payer: COMMERCIAL

## 2022-11-04 VITALS
HEART RATE: 80 BPM | OXYGEN SATURATION: 97 % | DIASTOLIC BLOOD PRESSURE: 76 MMHG | BODY MASS INDEX: 27.63 KG/M2 | HEIGHT: 72 IN | WEIGHT: 204 LBS | RESPIRATION RATE: 18 BRPM | SYSTOLIC BLOOD PRESSURE: 118 MMHG | TEMPERATURE: 98.5 F

## 2022-11-04 DIAGNOSIS — L91.8 SKIN TAG: ICD-10-CM

## 2022-11-04 DIAGNOSIS — Z00.00 PREVENTATIVE HEALTH CARE: Primary | ICD-10-CM

## 2022-11-04 DIAGNOSIS — Z12.11 SCREEN FOR COLON CANCER: ICD-10-CM

## 2022-11-04 DIAGNOSIS — Z11.4 SCREENING FOR HIV (HUMAN IMMUNODEFICIENCY VIRUS): ICD-10-CM

## 2022-11-04 DIAGNOSIS — D17.30 LIPOMA OF SKIN AND SUBCUTANEOUS TISSUE: ICD-10-CM

## 2022-11-04 DIAGNOSIS — Z11.59 NEED FOR HEPATITIS C SCREENING TEST: ICD-10-CM

## 2022-11-04 DIAGNOSIS — L82.0 INFLAMED SEBORRHEIC KERATOSIS: ICD-10-CM

## 2022-11-04 PROCEDURE — 99213 OFFICE O/P EST LOW 20 MIN: CPT | Mod: 25 | Performed by: FAMILY MEDICINE

## 2022-11-04 PROCEDURE — 86803 HEPATITIS C AB TEST: CPT | Performed by: FAMILY MEDICINE

## 2022-11-04 PROCEDURE — 99396 PREV VISIT EST AGE 40-64: CPT | Mod: 25 | Performed by: FAMILY MEDICINE

## 2022-11-04 PROCEDURE — 36415 COLL VENOUS BLD VENIPUNCTURE: CPT | Performed by: FAMILY MEDICINE

## 2022-11-04 PROCEDURE — 90471 IMMUNIZATION ADMIN: CPT | Performed by: FAMILY MEDICINE

## 2022-11-04 PROCEDURE — 87389 HIV-1 AG W/HIV-1&-2 AB AG IA: CPT | Performed by: FAMILY MEDICINE

## 2022-11-04 PROCEDURE — 90686 IIV4 VACC NO PRSV 0.5 ML IM: CPT | Performed by: FAMILY MEDICINE

## 2022-11-04 ASSESSMENT — ENCOUNTER SYMPTOMS
SHORTNESS OF BREATH: 0
PARESTHESIAS: 0
EYE PAIN: 0
CONSTIPATION: 0
HEADACHES: 0
FEVER: 0
HEMATURIA: 0
NERVOUS/ANXIOUS: 0
COUGH: 0
SORE THROAT: 0
ARTHRALGIAS: 0
DYSURIA: 0
JOINT SWELLING: 0
MYALGIAS: 0
WEAKNESS: 0
CHILLS: 0
HEARTBURN: 0
DIZZINESS: 0
NAUSEA: 0
DIARRHEA: 0
ABDOMINAL PAIN: 0
HEMATOCHEZIA: 0
PALPITATIONS: 0
FREQUENCY: 0

## 2022-11-04 ASSESSMENT — PAIN SCALES - GENERAL: PAINLEVEL: NO PAIN (0)

## 2022-11-04 NOTE — PROGRESS NOTES
SUBJECTIVE:   CC: Osei is an 47 year old who presents for preventative health visit.     Patient has been advised of split billing requirements and indicates understanding: Yes  Healthy Habits:     Getting at least 3 servings of Calcium per day:  Yes    Bi-annual eye exam:  Yes    Dental care twice a year:  Yes    Sleep apnea or symptoms of sleep apnea:  None    Diet:  Other    Frequency of exercise:  2-3 days/week    Duration of exercise:  Less than 15 minutes    Taking medications regularly:  Yes    Medication side effects:  Not applicable    PHQ-2 Total Score: 0    Additional concerns today:  No    Mole   -Chronic, feels like it has gotten bigger.  -Not painful, not itchy.   -Denies any crusting or oozing.  -No change in color.   -Typically has a full beard, is difficult to monitor.      Other skin lesion  -Right sided  -Over the last year  -Not irritating, has not grown or changed  -No redness.   -Also reports skin tag (large) on the belt line.    Today's PHQ-2 Score:   PHQ-2 ( 1999 Pfizer) 11/4/2022   Q1: Little interest or pleasure in doing things 0   Q2: Feeling down, depressed or hopeless 0   PHQ-2 Score 0   Q1: Little interest or pleasure in doing things Not at all   Q2: Feeling down, depressed or hopeless Not at all   PHQ-2 Score 0       Abuse: Current or Past(Physical, Sexual or Emotional)- No  Do you feel safe in your environment? Yes    Have you ever done Advance Care Planning? (For example, a Health Directive, POLST, or a discussion with a medical provider or your loved ones about your wishes): No, advance care planning information given to patient to review.  Advanced care planning was discussed at today's visit.    Social History     Tobacco Use     Smoking status: Former     Types: Cigarettes     Smokeless tobacco: Never     Tobacco comments:     quit smoking about 2016; quit chewing tobacco about 1990; vaped for about 1 year   Substance Use Topics     Alcohol use: Yes     Comment: social     If  you drink alcohol do you typically have >3 drinks per day or >7 drinks per week? Yes      Alcohol Use 11/4/2022   Prescreen: >3 drinks/day or >7 drinks/week? Yes   AUDIT SCORE  5     AUDIT - Alcohol Use Disorders Identification Test - Reproduced from the World Health Organization Audit 2001 (Second Edition) 11/4/2022   1.  How often do you have a drink containing alcohol? 2 to 3 times a week   2.  How many drinks containing alcohol do you have on a typical day when you are drinking? 1 or 2   3.  How often do you have five or more drinks on one occasion? Monthly   4.  How often during the last year have you found that you were not able to stop drinking once you had started? Never   5.  How often during the last year have you failed to do what was normally expected of you because of drinking? Never   6.  How often during the last year have you needed a first drink in the morning to get yourself going after a heavy drinking session? Never   7.  How often during the last year have you had a feeling of guilt or remorse after drinking? Never   8.  How often during the last year have you been unable to remember what happened the night before because of your drinking? Never   9.  Have you or someone else been injured because of your drinking? No   10. Has a relative, friend, doctor or other health care worker been concerned about your drinking or suggested you cut down? No   TOTAL SCORE 5       Last PSA: No results found for: PSA    Reviewed orders with patient. Reviewed health maintenance and updated orders accordingly - Yes  Lab work is in process  Labs reviewed in EPIC    Reviewed and updated as needed this visit by clinical staff   Tobacco  Allergies  Meds  Problems  Med Hx  Surg Hx  Fam Hx  Soc   Hx        Wt Readings from Last 5 Encounters:   11/04/22 92.5 kg (204 lb)   02/16/22 99.7 kg (219 lb 14.4 oz)   01/25/21 98.2 kg (216 lb 6.4 oz)   12/21/20 101.4 kg (223 lb 8 oz)   06/12/20 97.4 kg (214 lb 11.2 oz)  "    Reviewed and updated as needed this visit by Provider   Tobacco  Allergies  Meds  Problems  Med Hx  Surg Hx  Fam Hx         History reviewed. No pertinent past medical history.   Past Surgical History:   Procedure Laterality Date     ARTHROSCOPY KNEE Left     dislocated the knee cap several times        Review of Systems   Constitutional: Negative for chills and fever.   HENT: Negative for congestion, ear pain, hearing loss and sore throat.    Eyes: Negative for pain and visual disturbance.   Respiratory: Negative for cough and shortness of breath.    Cardiovascular: Negative for chest pain, palpitations and peripheral edema.   Gastrointestinal: Negative for abdominal pain, constipation, diarrhea, heartburn, hematochezia and nausea.   Genitourinary: Negative for dysuria, frequency, genital sores, hematuria, impotence, penile discharge and urgency.   Musculoskeletal: Negative for arthralgias, joint swelling and myalgias.   Skin: Negative for rash.   Neurological: Negative for dizziness, weakness, headaches and paresthesias.   Psychiatric/Behavioral: Negative for mood changes. The patient is not nervous/anxious.        OBJECTIVE:   BP (!) 142/89 (BP Location: Right arm, Patient Position: Sitting, Cuff Size: Adult Regular)   Pulse 80   Temp 98.5  F (36.9  C) (Oral)   Resp 18   Ht 1.816 m (5' 11.5\")   Wt 92.5 kg (204 lb)   SpO2 97%   BMI 28.06 kg/m      Physical Exam  Constitutional:       Appearance: Normal appearance.   HENT:      Head: Normocephalic and atraumatic.   Eyes:      Extraocular Movements: Extraocular movements intact.      Conjunctiva/sclera: Conjunctivae normal.      Pupils: Pupils are equal, round, and reactive to light.   Cardiovascular:      Rate and Rhythm: Normal rate and regular rhythm.   Pulmonary:      Effort: Pulmonary effort is normal.      Breath sounds: Normal breath sounds.   Abdominal:      General: Abdomen is flat.   Musculoskeletal:         General: Normal range of " motion.      Cervical back: Normal range of motion and neck supple.   Skin:     General: Skin is warm.      Comments: Subcutaneous, soft rubbery ovoid lesion along the right iliac crest.     L sided stuck on appearing brown raised macule      Large pedunculated brown nodule   Neurological:      General: No focal deficit present.      Mental Status: He is alert.   Psychiatric:         Mood and Affect: Mood normal.         Behavior: Behavior normal.         ASSESSMENT/PLAN:   Osei was seen today for physical.    Diagnoses and all orders for this visit:    Preventative health care  Patient here for wellness and physical.  Recommendations as below.    Screening for HIV (human immunodeficiency virus)  -     HIV Antigen Antibody Combo; Future  -     HIV Antigen Antibody Combo    Need for hepatitis C screening test  -     Hepatitis C Screen Reflex to HCV RNA Quant and Genotype; Future  -     Hepatitis C Screen Reflex to HCV RNA Quant and Genotype    Screen for colon cancer  -     Cancel: Fecal colorectal cancer screen FIT - Future (S+30); Future  -     Colonoscopy Screening  Referral; Future    Lipoma of skin and subcutaneous tissue  New, benign lesion.  No intervention necessary unless patient feels uncomfortable.     Inflamed seborrheic keratosis  Chronic with recent changes.  Overall benign appearing.  Due to its area, could opt for biopsy and removal, patient declined at this time.    Skin tag  Chronic, stable this is along the patient's waistband to his pants.  Given the size and location of the lesion, it would be reasonable to do a skin tag removal due to its chronically becoming inflamed.  Patient declined at this time    Other orders  -     REVIEW OF HEALTH MAINTENANCE PROTOCOL ORDERS  -     INFLUENZA VACCINE IM > 6 MONTHS VALENT IIV4 (AFLURIA/FLUZONE)        Patient has been advised of split billing requirements and indicates understanding: Yes      COUNSELING:        Healthy diet/nutrition       Vision  "screening       Alcohol Use        Colorectal cancer screening    Estimated body mass index is 28.06 kg/m  as calculated from the following:    Height as of this encounter: 1.816 m (5' 11.5\").    Weight as of this encounter: 92.5 kg (204 lb).     Weight management plan: Discussed healthy diet and exercise guidelines    He reports that he has quit smoking. His smoking use included cigarettes. He has never used smokeless tobacco.      Counseling Resources:  ATP IV Guidelines  Pooled Cohorts Equation Calculator  FRAX Risk Assessment  ICSI Preventive Guidelines  Dietary Guidelines for Americans, 2010  USDA's MyPlate  ASA Prophylaxis  Lung CA Screening    DO ZARIA [facility]  "

## 2022-11-05 LAB
HCV AB SERPL QL IA: NONREACTIVE
HIV 1+2 AB+HIV1 P24 AG SERPL QL IA: NONREACTIVE

## 2022-11-07 ENCOUNTER — TELEPHONE (OUTPATIENT)
Dept: GASTROENTEROLOGY | Facility: CLINIC | Age: 47
End: 2022-11-07

## 2022-11-07 NOTE — RESULT ENCOUNTER NOTE
Dear Osei,     Here are your recent results which are within the expected range. Please continue with your current plan of care and let us know if you have any questions or concerns.    Regards,  Daniella Piper, DO

## 2022-11-07 NOTE — TELEPHONE ENCOUNTER
Screening Questions  BLUE  KIND OF PREP RED  LOCATION [review exclusion criteria] GREEN  SEDATION TYPE        y Are you active on mychart?       Feliz Ordering/Referring Provider?        [facility] What type of coverage do you have?      y 9/6/22 Have you had a positive covid test in the last 90 days?     28.0 1. BMI  [BMI 40+ - review exclusion criteria]    y  2. Are you able to give consent for your medical care? [IF NO,RN REVIEW]        n  3. Are you taking any prescription pain medications on a routine schedule?      n  3a. EXTENDED PREP What kind of prescription?     n 4. Do you have any chemical dependencies such as alcohol, street drugs, or methadone?    n 5. Do you have any history of post-traumatic stress syndrome, severe anxiety or history of psychosis?      **If yes 3- 5 , please schedule with MAC sedation.**          IF YES TO ANY 6 - 10 - HOSPITAL SETTING ONLY.     n 6.   Do you need assistance transferring?     n 7.   Have you had a heart or lung transplant?    n 8.   Are you currently on dialysis?   n 9.   Do you use daily home oxygen?   n 10. Do you take nitroglycerin?   10a. n If yes, how often?     11. [FEMALES]  n Are you currently pregnant?    11a. n If yes, how many weeks? [ Greater than 12 weeks, OR NEEDED]    n 12. Do you have Pulmonary Hypertension? *NEED PAC APPT AT [facility]*     n 13. [review exclusion criteria]  Do you have any implantable devices in your body (pacemaker, defib, LVAD)?    n 14. In the past 6 months, have you had any heart related issues including cardiomyopathy or heart attack?     14a. n If yes, did it require cardiac stenting if so when?     n 15. Have you had a stroke or Transient ischemic attack (TIA - aka  mini stroke ) within 6 months?      n 16. Do you have mod to severe Obstructive Sleep Apnea?  [Hospital only - Ok at [place]]    n 17. Do you have SEVERE AND UNCONTROLLED asthma? *NEED PAC APPT AT [facility]*     n 18. Are you currently taking any blood thinners?     18a. If  "yes, inform patient to \"follow up w/ ordering provider for bridging instructions.\"    n 19. Do you take the medication Phentermine?    19a. If yes, \"Hold for 7 days before procedure.  Please consult your prescribing provider if you have questions about holding this medication.\"     n  20. Do you have chronic kidney disease?      n  21. Do you have a diagnosis of diabetes?     n  22. On a regular basis do you go 3-5 days between bowel movements?      23. Preferred LOCAL Pharmacy for Pre Prescription    [ LIST ONLY ONE PHARMACY]     [id number] - [address]        - CLOSING REMINDERS -    Informed patient they will need an adult    Cannot take any type of public or medical transportation alone    Conscious Sedation- Needs  for 6 hours after the procedure       MAC/General-Needs  for 24 hours after procedure    Pre-Procedure Covid test to be completed [[facility] PCR Testing Required]    Confirmed Nurse will call to complete assessment       - SCHEDULING DETAILS -       Surgeon      Date of Procedure  Lower Endoscopy [Colonoscopy]  Type of Procedure Scheduled    Location  [facility]-If you answer yes to questions #8, #20, #21Which Colonoscopy Prep was Sent?      Sedation Type      PAC / Pre-op Required         Additional comments:  Patient wants [facility] Gave phone number.          "

## 2022-11-15 ENCOUNTER — TELEPHONE (OUTPATIENT)
Dept: GASTROENTEROLOGY | Facility: CLINIC | Age: 47
End: 2022-11-15

## 2022-11-15 NOTE — TELEPHONE ENCOUNTER
Screening Questions  BLUE  KIND OF PREP RED  LOCATION [review exclusion criteria] GREEN  SEDATION TYPE        Y Are you active on mychart?      ABELARDO  Ordering/Referring Provider?        [facility] What type of coverage do you have?      Y Have you had a positive covid test in the last 90 days?        Date:9/6  1. 28.1  BMI  [BMI 40+ - review exclusion criteria]  2. Y  Are you able to give consent for your medical care? [RN REVIEW?]        3. N  Are you taking any prescription pain medications on a routine schedule?        3a.  EXTENDED PREP What kind of prescription?   4. N Do you have any chemical dependencies such as alcohol, street drugs, or methadone?    5. N Do you have any history of post-traumatic stress syndrome, severe anxiety or history of psychosis?      **If yes 2- 5 , please schedule with MAC sedation.**    BMI OVER 40 NEED PAC EVALUATION FOR [facility]          IF YES TO ANY 6 - 10 - HOSPITAL SETTING ONLY.     6.   N Do you need assistance transferring?     7.   N Have you had a heart or lung transplant?    8.   N Are you currently on dialysis?   9.   N Do you use daily home oxygen?   10. N Do you take nitroglycerin?   10a.  If yes, how often?     11. [FEMALES]   Are you currently pregnant?    11a.  If yes, how many weeks? [ Greater than 12 weeks, OR NEEDED]    12. N Do you have Pulmonary Hypertension? *NEED PAC APPT AT [facility]*     13. N [review exclusion criteria]  Do you have any implantable devices in your body (pacemaker, defib, LVAD)?    14. N In the past 6 months, have you had any heart related issues including cardiomyopathy or heart attack?     14a.  If yes, did it require cardiac stenting if so when?     15. N Have you had a stroke or Transient ischemic attack (TIA - aka  mini stroke ) within 6 months?      16. N Do you have mod to severe Obstructive Sleep Apnea?  [Hospital only - Ok at [place]]    17. N Do you have SEVERE AND UNCONTROLLED asthma?     18. N Are you currently taking any blood thinners?  "    18a. If yes, inform patient to \"follow up w/ ordering provider for bridging instructions.\"    19. N Do you take the medication Phentermine?    19a. If yes, \"Hold for 7 days before procedure.  Please consult your prescribing provider if you have questions about holding this medication.\"     20. N  Do you have chronic kidney disease?      21. N  Do you have a diagnosis of diabetes?     22. N  On a regular basis do you go 3-5 days between bowel movements?     23.  Preferred LOCAL Pharmacy for Pre Prescription    [ LIST ONLY ONE PHARMACY] [id number] - [address]    - CLOSING REMINDERS -    Informed patient they will need an adult    Cannot take any type of public or medical transportation alone    Conscious Sedation- Needs  for 6 hours after the procedure       MAC/General-Needs  for 24 hours after procedure    Pre-Procedure Covid test to be completed [[facility] PCR Testing Required]    Confirmed Nurse will call to complete pre-assessment       - SCHEDULING DETAILS -     SHERICE FINE  Surgeon    12/15  Date of Procedure  Lower Endoscopy [Colonoscopy]  Type of Procedure Scheduled   [facility] (availability) Location  [facility]-If you answer yes to questions #8, #20, #21Which Colonoscopy Prep was Sent?     CS Sedation Type     N PAC / Pre-op Required         Additional comments:        "

## 2022-12-08 ENCOUNTER — TELEPHONE (OUTPATIENT)
Dept: GASTROENTEROLOGY | Facility: CLINIC | Age: 47
End: 2022-12-08

## 2022-12-08 DIAGNOSIS — Z12.11 SPECIAL SCREENING FOR MALIGNANT NEOPLASMS, COLON: Primary | ICD-10-CM

## 2022-12-08 RX ORDER — BISACODYL 5 MG/1
TABLET, DELAYED RELEASE ORAL
Qty: 4 TABLET | Refills: 0 | Status: SHIPPED | OUTPATIENT
Start: 2022-12-08

## 2022-12-08 NOTE — TELEPHONE ENCOUNTER
Pre assessment questions completed for upcoming colonoscopy  procedure scheduled on 12.15.22    COVID policy reviewed.     Reviewed procedural arrival time 1045 and facility location [facility]; [address] [place] [address], [address], [place] [address]    Designated  policy reviewed. Instructed to have someone stay 6 hours post procedure.     Anticoagulation/blood thinners? No    Electronic implanted devices? No    Diabetic? No    Reviewed standard golytely procedure prep instructions.     Patient verbalized understanding and had no questions or concerns at this time.    Margarita Balderrama RN

## 2022-12-15 ENCOUNTER — HOSPITAL ENCOUNTER (OUTPATIENT)
Facility: CLINIC | Age: 47
Discharge: HOME | End: 2022-12-15
Attending: INTERNAL MEDICINE | Admitting: INTERNAL MEDICINE
Payer: COMMERCIAL

## 2022-12-15 VITALS
HEART RATE: 71 BPM | OXYGEN SATURATION: 97 % | RESPIRATION RATE: 9 BRPM | SYSTOLIC BLOOD PRESSURE: 131 MMHG | DIASTOLIC BLOOD PRESSURE: 91 MMHG

## 2022-12-15 LAB — COLONOSCOPY: NORMAL

## 2022-12-15 PROCEDURE — G0121 COLON CA SCRN NOT HI RSK IND: HCPCS | Performed by: INTERNAL MEDICINE

## 2022-12-15 PROCEDURE — G0500 MOD SEDAT ENDO SERVICE >5YRS: HCPCS | Performed by: INTERNAL MEDICINE

## 2022-12-15 PROCEDURE — 45378 DIAGNOSTIC COLONOSCOPY: CPT | Performed by: INTERNAL MEDICINE

## 2022-12-15 PROCEDURE — 250N000011 HC RX IP 250 OP 636: Performed by: INTERNAL MEDICINE

## 2022-12-15 RX ORDER — FENTANYL CITRATE 50 UG/ML
INJECTION, SOLUTION INTRAMUSCULAR; INTRAVENOUS PRN
Status: DISCONTINUED | OUTPATIENT
Start: 2022-12-15 | End: 2022-12-15 | Stop reason: HOSPADM

## 2022-12-15 RX ORDER — DIPHENHYDRAMINE HYDROCHLORIDE 50 MG/ML
INJECTION INTRAMUSCULAR; INTRAVENOUS PRN
Status: DISCONTINUED | OUTPATIENT
Start: 2022-12-15 | End: 2022-12-15 | Stop reason: HOSPADM

## 2022-12-15 RX ORDER — ONDANSETRON 2 MG/ML
4 INJECTION INTRAMUSCULAR; INTRAVENOUS
Status: DISCONTINUED | OUTPATIENT
Start: 2022-12-15 | End: 2022-12-15 | Stop reason: HOSPADM

## 2022-12-15 RX ORDER — LIDOCAINE 40 MG/G
CREAM TOPICAL
Status: DISCONTINUED | OUTPATIENT
Start: 2022-12-15 | End: 2022-12-15 | Stop reason: HOSPADM

## 2022-12-15 RX ORDER — SODIUM CHLORIDE, SODIUM LACTATE, POTASSIUM CHLORIDE, CALCIUM CHLORIDE 600; 310; 30; 20 MG/100ML; MG/100ML; MG/100ML; MG/100ML
INJECTION, SOLUTION INTRAVENOUS CONTINUOUS
Status: DISCONTINUED | OUTPATIENT
Start: 2022-12-15 | End: 2022-12-15 | Stop reason: HOSPADM

## 2022-12-15 ASSESSMENT — ACTIVITIES OF DAILY LIVING (ADL): ADLS_ACUITY_SCORE: 35

## 2022-12-15 NOTE — OR NURSING
Colonoscopy with no interventions performed under moderate sedation, patient tolerated well. Transferred to  and report given to recovery RN.

## 2022-12-15 NOTE — H&P
Osei ANDERSON Koffi  [id number]  male  47 year old      Reason for procedure/surgery:   screening colonoscopy      There is no problem list on file for this patient.      Past Surgical History:    Past Surgical History:   Procedure Laterality Date     ARTHROSCOPY KNEE Left     dislocated the knee cap several times        Past Medical History: History reviewed. No pertinent past medical history.    Social History:   Social History     Tobacco Use     Smoking status: Former     Types: Cigarettes     Smokeless tobacco: Never     Tobacco comments:     quit smoking about 2016; quit chewing tobacco about 1990; vaped for about 1 year   Substance Use Topics     Alcohol use: Yes     Comment: social       Family History:   Family History   Problem Relation Age of Onset     Chronic Obstructive Pulmonary Disease Mother      No Known Problems Father      No Known Problems Brother        Allergies:   Allergies   Allergen Reactions     Bee Venom Protein (Honey Bee) [Bee Venom] Swelling     Hymenoptera Allergenic Extract [Wasp Venom Protein] Unknown       Active Medications:   No current outpatient medications on file.       Systemic Review:   CONSTITUTIONAL: NEGATIVE for fever, chills, change in weight  ENT/MOUTH: NEGATIVE for ear, mouth and throat problems  RESP: NEGATIVE for significant cough or SOB  CV: NEGATIVE for chest pain, palpitations or peripheral edema    Physical Examination:   Vital Signs: BP (!) 124/94   Pulse 71   Resp 16   SpO2 97%   GENERAL: healthy, alert and no distress  NECK: no adenopathy, no asymmetry, masses, or scars  RESP: lungs clear to auscultation - no rales, rhonchi or wheezes  CV: regular rate and rhythm, normal S1 S2, no S3 or S4, no murmur, click or rub, no peripheral edema and peripheral pulses strong  ABDOMEN: soft, nontender, no hepatosplenomegaly, no masses and bowel sounds normal  MS: no gross musculoskeletal defects noted, no edema    ASA: 2    Mallampati Score: 2    Plan: Appropriate to proceed  as scheduled.      Connor Leach MD  12/15/2022    PCP:  Lore Schultz

## 2023-12-23 ENCOUNTER — HEALTH MAINTENANCE LETTER (OUTPATIENT)
Age: 48
End: 2023-12-23

## 2024-01-10 ENCOUNTER — HOSPITAL ENCOUNTER (EMERGENCY)
Facility: HOSPITAL | Age: 49
Discharge: HOME | End: 2024-01-10
Attending: EMERGENCY MEDICINE

## 2024-01-10 VITALS
HEART RATE: 83 BPM | RESPIRATION RATE: 15 BRPM | OXYGEN SATURATION: 97 % | TEMPERATURE: 98 F | WEIGHT: 212.31 LBS | SYSTOLIC BLOOD PRESSURE: 140 MMHG | DIASTOLIC BLOOD PRESSURE: 80 MMHG

## 2024-01-10 DIAGNOSIS — R40.20 LOSS OF CONSCIOUSNESS: Primary | ICD-10-CM

## 2024-01-10 DIAGNOSIS — R55 SYNCOPE: ICD-10-CM

## 2024-01-10 LAB
ALBUMIN SERPL BCP-MCNC: 4.3 G/DL (ref 3.5–5.2)
ALP SERPL-CCNC: 45 U/L (ref 55–135)
ALT SERPL W/O P-5'-P-CCNC: 34 U/L (ref 10–44)
AMPHET+METHAMPHET UR QL: NEGATIVE
ANION GAP SERPL CALC-SCNC: 11 MMOL/L (ref 8–16)
AST SERPL-CCNC: 23 U/L (ref 10–40)
BARBITURATES UR QL SCN>200 NG/ML: NEGATIVE
BASOPHILS # BLD AUTO: 0.04 K/UL (ref 0–0.2)
BASOPHILS NFR BLD: 0.5 % (ref 0–1.9)
BENZODIAZ UR QL SCN>200 NG/ML: NEGATIVE
BILIRUB SERPL-MCNC: 0.9 MG/DL (ref 0.1–1)
BILIRUB UR QL STRIP: NEGATIVE
BUN SERPL-MCNC: 14 MG/DL (ref 6–20)
BZE UR QL SCN: NEGATIVE
CALCIUM SERPL-MCNC: 9.3 MG/DL (ref 8.7–10.5)
CANNABINOIDS UR QL SCN: ABNORMAL
CHLORIDE SERPL-SCNC: 105 MMOL/L (ref 95–110)
CK SERPL-CCNC: 70 U/L (ref 20–200)
CLARITY UR: CLEAR
CO2 SERPL-SCNC: 22 MMOL/L (ref 23–29)
COLOR UR: COLORLESS
CREAT SERPL-MCNC: 1.1 MG/DL (ref 0.5–1.4)
CREAT UR-MCNC: 16 MG/DL (ref 23–375)
DIFFERENTIAL METHOD BLD: ABNORMAL
EOSINOPHIL # BLD AUTO: 0 K/UL (ref 0–0.5)
EOSINOPHIL NFR BLD: 0.2 % (ref 0–8)
ERYTHROCYTE [DISTWIDTH] IN BLOOD BY AUTOMATED COUNT: 11.9 % (ref 11.5–14.5)
EST. GFR  (NO RACE VARIABLE): >60 ML/MIN/1.73 M^2
GLUCOSE SERPL-MCNC: 135 MG/DL (ref 70–110)
GLUCOSE UR QL STRIP: NEGATIVE
HCT VFR BLD AUTO: 42.7 % (ref 40–54)
HGB BLD-MCNC: 15.6 G/DL (ref 14–18)
HGB UR QL STRIP: NEGATIVE
IMM GRANULOCYTES # BLD AUTO: 0.04 K/UL (ref 0–0.04)
IMM GRANULOCYTES NFR BLD AUTO: 0.5 % (ref 0–0.5)
KETONES UR QL STRIP: NEGATIVE
LEUKOCYTE ESTERASE UR QL STRIP: NEGATIVE
LYMPHOCYTES # BLD AUTO: 0.9 K/UL (ref 1–4.8)
LYMPHOCYTES NFR BLD: 10.8 % (ref 18–48)
MCH RBC QN AUTO: 30.6 PG (ref 27–31)
MCHC RBC AUTO-ENTMCNC: 36.5 G/DL (ref 32–36)
MCV RBC AUTO: 84 FL (ref 82–98)
METHADONE UR QL SCN>300 NG/ML: NEGATIVE
MONOCYTES # BLD AUTO: 0.6 K/UL (ref 0.3–1)
MONOCYTES NFR BLD: 7.5 % (ref 4–15)
NEUTROPHILS # BLD AUTO: 6.6 K/UL (ref 1.8–7.7)
NEUTROPHILS NFR BLD: 80.5 % (ref 38–73)
NITRITE UR QL STRIP: NEGATIVE
NRBC BLD-RTO: 0 /100 WBC
OPIATES UR QL SCN: NEGATIVE
PCP UR QL SCN>25 NG/ML: NEGATIVE
PH UR STRIP: 7 [PH] (ref 5–8)
PLATELET # BLD AUTO: 283 K/UL (ref 150–450)
PMV BLD AUTO: 10.2 FL (ref 9.2–12.9)
POTASSIUM SERPL-SCNC: 3.6 MMOL/L (ref 3.5–5.1)
PROT SERPL-MCNC: 7.3 G/DL (ref 6–8.4)
PROT UR QL STRIP: NEGATIVE
RBC # BLD AUTO: 5.09 M/UL (ref 4.6–6.2)
SODIUM SERPL-SCNC: 138 MMOL/L (ref 136–145)
SP GR UR STRIP: <1.005 (ref 1–1.03)
TOXICOLOGY INFORMATION: ABNORMAL
TROPONIN I SERPL DL<=0.01 NG/ML-MCNC: <0.006 NG/ML (ref 0–0.03)
URN SPEC COLLECT METH UR: ABNORMAL
UROBILINOGEN UR STRIP-ACNC: NEGATIVE EU/DL
WBC # BLD AUTO: 8.18 K/UL (ref 3.9–12.7)

## 2024-01-10 PROCEDURE — 99285 EMERGENCY DEPT VISIT HI MDM: CPT | Mod: 25

## 2024-01-10 PROCEDURE — 93005 ELECTROCARDIOGRAM TRACING: CPT

## 2024-01-10 PROCEDURE — 25500020 PHARM REV CODE 255: Performed by: EMERGENCY MEDICINE

## 2024-01-10 PROCEDURE — 93010 ELECTROCARDIOGRAM REPORT: CPT | Mod: ,,, | Performed by: INTERNAL MEDICINE

## 2024-01-10 PROCEDURE — 81003 URINALYSIS AUTO W/O SCOPE: CPT | Mod: 59 | Performed by: EMERGENCY MEDICINE

## 2024-01-10 PROCEDURE — 85025 COMPLETE CBC W/AUTO DIFF WBC: CPT

## 2024-01-10 PROCEDURE — 80053 COMPREHEN METABOLIC PANEL: CPT

## 2024-01-10 PROCEDURE — 84484 ASSAY OF TROPONIN QUANT: CPT

## 2024-01-10 PROCEDURE — 80307 DRUG TEST PRSMV CHEM ANLYZR: CPT | Performed by: EMERGENCY MEDICINE

## 2024-01-10 PROCEDURE — 82550 ASSAY OF CK (CPK): CPT

## 2024-01-10 RX ORDER — METHOCARBAMOL 500 MG/1
1000 TABLET, FILM COATED ORAL 3 TIMES DAILY PRN
Qty: 30 TABLET | Refills: 0 | Status: SHIPPED | OUTPATIENT
Start: 2024-01-10 | End: 2024-01-15

## 2024-01-10 RX ADMIN — IOHEXOL 100 ML: 350 INJECTION, SOLUTION INTRAVENOUS at 05:01

## 2024-01-10 NOTE — ED PROVIDER NOTES
"Encounter Date: 1/10/2024       History     Chief Complaint   Patient presents with    Loss of Consciousness     At airport after mental stress preparing for a trip.  Limited sleep last night.  "Not back to 100%"     Patient is a 48-year-old male medical history who presents to the ED via EMS with complaint of loss of consciousness.  Patient states that he was traveling via airplane from [facility] to [facility] today when he lost consciousness following ascent en route.  He states that he was about 10 minutes into the flight when he reportedly passed out.  He denies any preceding aura, chest pain, shortness of breath, confusion prior to passing out.  He states that his vision to became blurry before passing out.  That he was out for approximately "1 minute and that when he regained consciousness he was not confused.  Denies any urinary incontinence, biting of his tongue after his reported loss of consciousness.  Denies any recent history of syncope with states that approximately 20 years ago he passed out randomly while watching a basketball game.  Denies any new medications, increased stressors but states that he did not sleep much the night before secondary to getting ready for his trip to [place].      Review of patient's allergies indicates:  No Known Allergies  No past medical history on file.  No past surgical history on file.  No family history on file.     Review of Systems   Constitutional:  Negative for fever.   Respiratory:  Negative for chest tightness and shortness of breath.    Cardiovascular:  Negative for chest pain, palpitations and leg swelling.   Gastrointestinal:  Negative for abdominal pain, nausea and vomiting.   Musculoskeletal:  Negative for back pain and myalgias.   Neurological:  Positive for syncope. Negative for dizziness, tremors, weakness and light-headedness.   Psychiatric/Behavioral:  Negative for agitation and confusion.        Physical Exam     Initial " Vitals [01/10/24 1417]   BP Pulse Resp Temp SpO2   (!) 152/100 104 20 98.4 °F (36.9 °C) 97 %      MAP       --         Physical Exam    Nursing note and vitals reviewed.  Constitutional: He appears well-developed and well-nourished. No distress.   Well-appearing   No acute distress  Pleasant  Lucid    HENT:   Head: Normocephalic and atraumatic.   Right Ear: External ear normal.   Left Ear: External ear normal.   Mouth/Throat: Oropharynx is clear and moist.   Oropharynx clear and moist    Eyes: Conjunctivae and EOM are normal. Pupils are equal, round, and reactive to light.   No abnormal eye movements; no nystagmus    Neck: Neck supple.   Normal ROM    Normal range of motion.  Cardiovascular:  Normal rate, regular rhythm, normal heart sounds and intact distal pulses.           Pulmonary/Chest: Breath sounds normal.   Abdominal: Abdomen is soft. Bowel sounds are normal.   Musculoskeletal:         General: Normal range of motion.      Cervical back: Normal range of motion and neck supple.     Neurological: He is alert and oriented to person, place, and time. He has normal strength. GCS score is 15. GCS eye subscore is 4. GCS verbal subscore is 5. GCS motor subscore is 6.   No focal neurological deficit   Strength 5/5   Sensation grossly intact  MAEW  GCS 15  AAOx3   Gait WNL  HTS WNL  FTN WNL      Skin: Skin is warm. Capillary refill takes less than 2 seconds.   Psychiatric: He has a normal mood and affect. Thought content normal.   Pleasant  Lucid          ED Course   Procedures  Labs Reviewed   CBC W/ AUTO DIFFERENTIAL - Abnormal; Notable for the following components:       Result Value    MCHC 36.5 (*)     Lymph # 0.9 (*)     Gran % 80.5 (*)     Lymph % 10.8 (*)     All other components within normal limits   COMPREHENSIVE METABOLIC PANEL - Abnormal; Notable for the following components:    CO2 22 (*)     Glucose 135 (*)     Alkaline Phosphatase 45 (*)     All other components within normal limits   URINALYSIS,  REFLEX TO URINE CULTURE - Abnormal; Notable for the following components:    Color, UA Colorless (*)     Specific Gravity, UA <1.005 (*)     All other components within normal limits    Narrative:     Specimen Source->Urine   DRUG SCREEN PANEL, URINE EMERGENCY - Abnormal; Notable for the following components:    THC Presumptive Positive (*)     Creatinine, Urine 16.0 (*)     All other components within normal limits    Narrative:     Specimen Source->Urine   TROPONIN I   CK   TROPONIN I   CK   POCT GLUCOSE MONITORING CONTINUOUS          Imaging Results              CTA Head and Neck (xpd) (Final result)  Result time 01/10/24 17:29:37      Final result by Jim Zavala DO (01/10/24 17:29:37)                   Impression:      Unremarkable CTA head and neck.  No large vessel occlusion or high-grade stenosis.      Electronically signed by: Jim Zavala  Date:    01/10/2024  Time:    17:29               Narrative:    EXAMINATION:  CTA HEAD AND NECK (XPD)    CLINICAL HISTORY:  Syncope, recurrent;    TECHNIQUE:  Non contrast low dose axial images were obtained though the head. CT angiogram was performed from the level of the neela to the top of the head following the IV administration of 100mL of Omnipaque 350.   Sagittal and coronal reconstructions and maximum intensity projection reconstructions were performed. Arterial stenosis percentages are based on NASCET measurement criteria. An immediate post-contrast CT head was also performed. RapidAI LVO was utilized to measure arterial blood flow in the brain.    COMPARISON:  None available.    FINDINGS:  CT head: The ventricles are normal in size without evidence of hydrocephalus. The brain parenchyma is within normal limits.  No parenchymal mass, edema or major vascular distribution infarct. There is no intracranial hemorrhage (agree with rapid AI assessment).  No extra-axial blood or fluid collection.    The cranium is intact.  Mastoid air cells and paranasal  sinuses are clear.  There is dental amalgam resulting in streak artifact      CTA head:    Anterior circulation: The bilateral intracranial ICAs are patent and unremarkable.  There is an SARAH BETH trifurcation, with a branch arising from the A-comm, a normal variant.  The bilateral anterior and middle cerebral arteries are otherwise within normal limits, without hemodynamically significant stenosis or occlusion.    Posterior circulation: Vertebrobasilar system is within normal limits without focal abnormality.  Persistent fetal origin of the bilateral posterior cerebral arteries.  The bilateral posterior cerebral arteries are otherwise within normal limits, without hemodynamically significant stenosis or occlusion.    There is no intracranial aneurysm.    CTA neck:    The aortic arch maintains a normal branching pattern.    The common and internal carotid arteries are normal in course and caliber. No significant stenosis in either carotid bifurcation.  There is minimal atherosclerosis of the right carotid bifurcation.  There is tortuosity of the bilateral cervical ICAs.    The vertebral origins are patent. The cervical vertebral arteries are normal in course and caliber.    The soft tissues of the neck are unremarkable.  The visualized lung apices are clear.    There is no acute fracture or subluxation of the cervical spine.                                       X-Ray Chest AP Portable (Final result)  Result time 01/10/24 15:27:33      Final result by Ishan Figueroa MD (01/10/24 15:27:33)                   Impression:      No acute abnormality.      Electronically signed by: Ishan Figueroa  Date:    01/10/2024  Time:    15:27               Narrative:    EXAMINATION:  XR CHEST AP PORTABLE    CLINICAL HISTORY:  Syncope and collapse    TECHNIQUE:  Single frontal view of the chest was performed.    COMPARISON:  None    FINDINGS:  The lungs are clear, with normal appearance of pulmonary vasculature and no pleural effusion or  pneumothorax.    The cardiac silhouette is normal in size. The hilar and mediastinal contours are unremarkable.    Bones are intact.                                       Medications   iohexoL (OMNIPAQUE 350) injection 100 mL (100 mLs Intravenous Given 1/10/24 1705)     Medical Decision Making  Amount and/or Complexity of Data Reviewed  Labs: ordered. Decision-making details documented in ED Course.  Radiology: ordered. Decision-making details documented in ED Course.    Risk  Prescription drug management.               ED Course as of 01/10/24 1821   Wed Jorge Alberto 10, 2024   1609 Marijuana (THC) Metabolite(!): Presumptive Positive [LC]   1610 Troponin I: <0.006 [LC]   1610 CPK: 70 [LC]   1610 X-Ray Chest AP Portable  NO acute cardiopulmonary process per my independent interpretation.  [LC]   1742 X-Ray Chest AP Portable [LC]   1749 I discussed the ED workup results, at length with the patient who verbalized understanding.  He denies any current chest pain shortness of breath numbness tingling nausea vomiting vision change complaints whatsoever.  He does report intermittent left trapezius muscle pain for several weeks which has improved with ibuprofen.  Will discharge him to home with prescription Robaxin. [LC]      ED Course User Index  [LC] Thomas Hickman MD                 Medical Decision Making:   Initial Assessment:   See HPI   Differential Diagnosis:   NSTEMI, STEMI, Intracranial hemorrhage, embolic stroke, carotid artery stenosis, hypoglycemia   Clinical Tests:   Lab Tests: Reviewed and Ordered  Radiological Study: Ordered and Reviewed  ED Management:  - ED workup largely unremarkable other than UDS positive for THC; chest x-ray demonstrates no acute cardiopulmonary process; CTA of the head and neck negative for acute intracranial abnormality per the final radiology read; patient without any episodes of syncope or syncope other complaints while in the ED' patient requesting discharge at this time; patient is  not septic, nontoxic in no acute distress; very low suspicion for myocardial ischemia; does report a history of poorly controlled high blood pressure in the past so will discharge patient home per his request; patient is complaining of some intermittent left trapezius pain/spasm that had started weeks ago and has been treated with ibuprofen (with improvement per the patient) and as such I will discharge home with prescription for Robaxin; pt ambulated in the ED without difficulty or untoward event  - No further intervention is indicated at this time after having taken into account the patient's history, physical exam findings, and empirical and objective data obtained during the patient's emergency department workup.   - The patient is at low risk for an emergent medical condition at this time, and I am of the belief that that it is safe to discharge the patient from the emergency department.   - The patient is instructed to follow up as outpatient as indicated on the discharge paperwork.    - I have discussed the specifics of the workup with the patient and the patient has verbalized understanding of the details of the workup, the diagnosis, the treatment plan, and the need for outpatient follow-up.    - Although the patient has no emergent etiology today this does not preclude the development of an emergent condition so, in addition, I have advised the patient that they can return to the ED and/or activate EMS at any time with worsening of their symptoms, change of their symptoms, or with any other medical complaint.    - The patient remained comfortable and stable during their visit in the ED.    - Discharge and follow-up instructions discussed with the patient who expressed understanding and willingness to comply with my recommendations.  - Results of all emergency department tests  discussed thoroughly with patient; all patient questions answered; pt in agreement with plan  - Pt instructed to follow up with PCP  in 2-3 days for recheck of today's complaints  - Pt given strict emergency department return precautions for any new or worsening of symptoms  - Pt discharged from the emergency department in stable condition, in no acute distress                 Clinical Impression:  Final diagnoses:  [R55] Syncope  [R40.20] Loss of consciousness (Primary)          ED Disposition Condition    Discharge Stable          ED Prescriptions       Medication Sig Dispense Start Date End Date Auth. Provider    methocarbamoL (ROBAXIN) 500 MG Tab Take 2 tablets (1,000 mg total) by mouth 3 (three) times daily as needed (muscle pain). 30 tablet 1/10/2024 1/15/2024 Thomas Hickman MD          Follow-up Information    None          Thomas Hickman MD  01/10/24 9097

## 2024-06-13 ENCOUNTER — TELEPHONE (OUTPATIENT)
Dept: VASCULAR SURGERY | Facility: CLINIC | Age: 49
End: 2024-06-13

## 2024-06-13 ENCOUNTER — OFFICE VISIT (OUTPATIENT)
Dept: FAMILY MEDICINE | Facility: CLINIC | Age: 49
End: 2024-06-13
Payer: COMMERCIAL

## 2024-06-13 VITALS
RESPIRATION RATE: 17 BRPM | BODY MASS INDEX: 28.99 KG/M2 | DIASTOLIC BLOOD PRESSURE: 84 MMHG | OXYGEN SATURATION: 97 % | SYSTOLIC BLOOD PRESSURE: 128 MMHG | WEIGHT: 214 LBS | HEART RATE: 71 BPM | TEMPERATURE: 97.8 F | HEIGHT: 72 IN

## 2024-06-13 DIAGNOSIS — Z13.9 ENCOUNTER FOR HEALTH-RELATED SCREENING: ICD-10-CM

## 2024-06-13 DIAGNOSIS — I83.92 VARICOSE VEINS OF LEFT LOWER EXTREMITY, UNSPECIFIED WHETHER COMPLICATED: Primary | ICD-10-CM

## 2024-06-13 DIAGNOSIS — M54.2 NECK PAIN: ICD-10-CM

## 2024-06-13 LAB
ALBUMIN SERPL BCG-MCNC: 4.5 G/DL (ref 3.5–5.2)
ALP SERPL-CCNC: 44 U/L (ref 40–150)
ALT SERPL W P-5'-P-CCNC: 31 U/L (ref 0–70)
ANION GAP SERPL CALCULATED.3IONS-SCNC: 9 MMOL/L (ref 7–15)
AST SERPL W P-5'-P-CCNC: 21 U/L (ref 0–45)
BILIRUB SERPL-MCNC: 0.8 MG/DL
BUN SERPL-MCNC: 14.6 MG/DL (ref 6–20)
CALCIUM SERPL-MCNC: 8.9 MG/DL (ref 8.6–10)
CHLORIDE SERPL-SCNC: 103 MMOL/L (ref 98–107)
CHOLEST SERPL-MCNC: 181 MG/DL
CREAT SERPL-MCNC: 1.14 MG/DL (ref 0.67–1.17)
DEPRECATED HCO3 PLAS-SCNC: 28 MMOL/L (ref 22–29)
EGFRCR SERPLBLD CKD-EPI 2021: 78 ML/MIN/1.73M2
ERYTHROCYTE [DISTWIDTH] IN BLOOD BY AUTOMATED COUNT: 12 % (ref 10–15)
FASTING STATUS PATIENT QL REPORTED: YES
FASTING STATUS PATIENT QL REPORTED: YES
GLUCOSE SERPL-MCNC: 101 MG/DL (ref 70–99)
HCT VFR BLD AUTO: 43.8 % (ref 40–53)
HDLC SERPL-MCNC: 45 MG/DL
HGB BLD-MCNC: 15.6 G/DL (ref 13.3–17.7)
LDLC SERPL CALC-MCNC: 106 MG/DL
MCH RBC QN AUTO: 31.1 PG (ref 26.5–33)
MCHC RBC AUTO-ENTMCNC: 35.6 G/DL (ref 31.5–36.5)
MCV RBC AUTO: 87 FL (ref 78–100)
NONHDLC SERPL-MCNC: 136 MG/DL
PLATELET # BLD AUTO: 315 10E3/UL (ref 150–450)
POTASSIUM SERPL-SCNC: 4.6 MMOL/L (ref 3.4–5.3)
PROT SERPL-MCNC: 7 G/DL (ref 6.4–8.3)
RBC # BLD AUTO: 5.02 10E6/UL (ref 4.4–5.9)
SODIUM SERPL-SCNC: 140 MMOL/L (ref 135–145)
TRIGL SERPL-MCNC: 148 MG/DL
WBC # BLD AUTO: 7.8 10E3/UL (ref 4–11)

## 2024-06-13 PROCEDURE — 80053 COMPREHEN METABOLIC PANEL: CPT | Performed by: FAMILY MEDICINE

## 2024-06-13 PROCEDURE — 85027 COMPLETE CBC AUTOMATED: CPT | Performed by: FAMILY MEDICINE

## 2024-06-13 PROCEDURE — 80061 LIPID PANEL: CPT | Performed by: FAMILY MEDICINE

## 2024-06-13 PROCEDURE — 36415 COLL VENOUS BLD VENIPUNCTURE: CPT | Performed by: FAMILY MEDICINE

## 2024-06-13 PROCEDURE — 99213 OFFICE O/P EST LOW 20 MIN: CPT | Performed by: FAMILY MEDICINE

## 2024-06-13 ASSESSMENT — PAIN SCALES - GENERAL: PAINLEVEL: NO PAIN (0)

## 2024-06-13 NOTE — PROGRESS NOTES
"  Assessment & Plan   Problem List Items Addressed This Visit       Varicose veins of left lower extremity, unspecified whether complicated - Primary     Exam findings were discussed     Plan:   Vein / vascular referral          Relevant Orders    Vascular Surgery Referral    Neck pain     Stiff neck and pain on the left side on going for a while     Plan:   Stretching exercise discussed   Consider Physical Therapy           Other Visit Diagnoses       Encounter for health-related screening        Relevant Orders    Comprehensive metabolic panel (BMP + Alb, Alk Phos, ALT, AST, Total. Bili, TP)    CBC with platelets    Lipid panel reflex to direct LDL Fasting             The longitudinal plan of care for the diagnosis(es)/condition(s) as documented were addressed during this visit. Due to the added complexity in care, I will continue to support Osei in the subsequent management and with ongoing continuity of care.      BMI  Estimated body mass index is 29.43 kg/m  as calculated from the following:    Height as of this encounter: 1.816 m (5' 11.5\").    Weight as of this encounter: 97.1 kg (214 lb).             Subjective   Osei is a 49 year old, presenting with the following concerns;     Varicose veins of left leg   Progressive worsening and causing discomfort  Been using the Comporession stockings    Neck pain  Stiff neck and pain on the left side on going for a while   Sometimes clicking in the neck. No radiating pain into the arms     Would like health-related screening labs            6/13/2024     8:24 AM   Additional Questions   Roomed by José Manuel CONKLIN CMA     History of Present Illness       Reason for visit:  Vericose veins -neck pain - routine checkup  Symptom onset:  More than a month  Symptom intensity:  Moderate  Symptom progression:  Staying the same  Had these symptoms before:  No  What makes it worse:  Sitting  What makes it better:  Compression sock    He eats 0-1 servings of fruits and vegetables " "daily.He consumes 1 sweetened beverage(s) daily.He exercises with enough effort to increase his heart rate 9 or less minutes per day.  He exercises with enough effort to increase his heart rate 3 or less days per week.   He is taking medications regularly.                     Objective    /84 (BP Location: Right arm, Patient Position: Sitting, Cuff Size: Adult Regular)   Pulse 71   Temp 97.8  F (36.6  C) (Oral)   Resp 17   Ht 1.816 m (5' 11.5\")   Wt 97.1 kg (214 lb)   SpO2 97%   BMI 29.43 kg/m    Body mass index is 29.43 kg/m .    Physical Exam   GENERAL: alert and no distress  NECK: no adenopathy and palpable tenderness to the left neck musculature, stiff with ROM   MS: Left leg: Multiple large superficial varices.    Right leg:  normal             Signed Electronically by: Lore Schultz MD    "

## 2024-06-13 NOTE — ASSESSMENT & PLAN NOTE
Stiff neck and pain on the left side on going for a while     Plan:   Stretching exercise discussed   Consider Physical Therapy

## 2024-07-09 NOTE — PATIENT INSTRUCTIONS
Osei    Thank you for entrusting your care with us at the [facility].      We are prescribing some compression stockings for you. I have included different suppliers that should help you get measured and fitting to ensure proper fitting socks. You should wear these stockings as much as you can. It is especially important to wear them with long periods of standing, sitting, long car rides or if you will be flying. Compression socks should get refilled every 4-6 months. They do not need to be worn at night while in bed. It is recommended to wear compression level of 20-30mmhg or higher from toes to knees.      We will perform an ultrasound today or prior to your appointment with us in 3 months time to evaluate the valves in your veins.      We would like you to follow up in 3 months after wearing socks to see how you are doing.        Varicose Veins    Varicose veins are twisted, enlarged veins near the surface of the skin. They develop most often in the legs and ankles.    Some people may be more likely than others to get varicose veins because of several things. These include aging, pregnancy, being overweight, or because a parent has them. Standing or sitting for long periods of time can also increase risk of varicose veins.    Follow-up care is a key part of your treatment and safety. Be sure to make and go to all appointments, and call your doctor if you are having problems. It's also a good idea to know your test results and keep a list of the medicines you take.      Varicose veins are caused by weakened valves and veins in your legs. Normally, one-way valves in your veins keep blood flowing from your legs up toward your heart. When these valves don't work as they should, blood collects in your legs, and pressure builds up. The veins become weak, large, and twisted.    How can you care for yourself at home?  Wear compression stockings during the day to help relieve symptoms and improve  blood flow. Talk to your doctor about which ones to get and where to get them.  Prop up your legs at or above the level of your heart when possible. Try to do this for about 30 minutes at a time, about 3 times a day. This helps keep the blood from pooling in your lower legs and improves blood flow to the rest of your body.  Avoid sitting and standing for long periods. This puts added stress on your veins.  Stay at a healthy weight. Lose weight if you need to.  Try to take several short walks every day.  Get at least 30 minutes of exercise on most days of the week. Walking is a good choice. You also may want to do other activities, such as running, swimming, cycling, or playing tennis or team sports.  Do calf muscle exercises every day. When you are sitting down, rotate your feet at the ankles in both directions, making small circles. Extend your legs, and point and flex your feet.  Avoid crossing your legs at the knees when sitting.  Take good care of your skin. Treat cuts and scrapes on your legs right away. Keep your legs clean and moisturized to prevent drying and cracking. Prevent sunburns.  Do not smoke. Smoking can make varicose veins worse. If you need help quitting, talk to your doctor about stop-smoking programs and medicines. These can increase your chances of quitting for good.  If you bump your leg so hard that you know it is likely to bruise, prop up your leg and apply ice or cold packs right away. Apply the ice or cold pack for 10 to 20 minutes, 3 or more times a day. Put a thin cloth between the ice and your skin.  If you cut or scratch the skin over a vein, it may bleed a lot. Prop up your leg and apply firm pressure for a full 15 minutes.  If you have a blood clot in a varicose vein, you may have tenderness and swelling over the vein. The vein may feel firm. Be sure to call your doctor right away if you have these symptoms. If your doctor has told you how to care for the clot, follow the  instructions.     Care may include the following:    Prop up your leg and apply a damp cloth that is warm or cool.  Ask your doctor if you can take an over-the-counter pain medicine, such as acetaminophen (Tylenol), ibuprofen (Advil, Motrin), or naproxen (Aleve). Be safe with medicines. Read and follow all instructions on the label.    When should you call for help?     Call 911 anytime you think you may need emergency care. For example, call if:    You have sudden chest pain and shortness of breath, or you cough up blood.    Call your doctor now or seek immediate medical care if:    You have signs of a blood clot in your leg (called a deep vein thrombosis), such as:  Pain in your calf, back of the knee, thigh, or groin.  Swelling in the leg or groin.  A color change on the leg or groin. The skin may be reddish or purplish, depending on your usual skin color.  A varicose vein begins to bleed and you cannot stop it.  You have a tender lump in your leg.  You get an open sore.    Watch closely for changes in your health, and be sure to contact your doctor if:    Your varicose vein symptoms do not improve with home treatment.    Current as of: December 19, 2022  Author: [employer]wise Staff  Medical Review:Aftab Lim MD - Family Medicine & NICOLETTE Lazcano MD - Internal Medicine & Jorge Crowley MD - Family Medicine & Doug Blair MD - Family Medicine & Beto Tom MD - Diagnostic Radiology    Please bring your compression prescription to a home medical supply store. Here is a list of locations but not limited to.     [facility]  [address] [place] [address]  Phone: [phone]  Fax: [phone] [facility] Bldg.  [address] [place] [address]  Phone: [phone]  Fax: [phone]   [facility] Bldg[facility]  [address] [place] [address]  Phone: [phone]  Fax: [phone] [facility]  [address] [place] [address]  Phone: [phone]  Fax: [phone]   [facility]  [address] [place] [address]  Phone: [phone] [facility]   [address] ([facility] [address]  Phone: [phone]   [facility]  [address] [place] [address]      Phone: [phone]  Fax: [phone] [place]  [address] [place] [address]      Phone: [phone]  Fax: [phone]     [facility] [email or web address]  [address], [place] [address]  [phone]    [facility]  [email or web address]  [address] [place] [address]  Phone: [phone]     [address] [place] [address]  Phone: [phone]    [address] [place] [address]  Phone: [phone] [address], [place] [address]  Phone: [phone] [address], [place] [address]  Phone: [phone]    [facility] [email or web address]  [address] [place] [address]  Phone: [phone]    [address], [place] [address]  Phone: [phone] [address], [place] [address]  Phone: [phone]    Jessika Chilel  [email or web address]  [phone]

## 2024-07-10 ENCOUNTER — OFFICE VISIT (OUTPATIENT)
Dept: VASCULAR SURGERY | Facility: CLINIC | Age: 49
End: 2024-07-10
Attending: FAMILY MEDICINE
Payer: COMMERCIAL

## 2024-07-10 VITALS — WEIGHT: 207 LBS | BODY MASS INDEX: 28.47 KG/M2 | RESPIRATION RATE: 16 BRPM

## 2024-07-10 DIAGNOSIS — I83.92 VARICOSE VEINS OF LEFT LOWER EXTREMITY, UNSPECIFIED WHETHER COMPLICATED: ICD-10-CM

## 2024-07-10 DIAGNOSIS — I83.893 SYMPTOMATIC VARICOSE VEINS OF BOTH LOWER EXTREMITIES: Primary | ICD-10-CM

## 2024-07-10 PROCEDURE — 99243 OFF/OP CNSLTJ NEW/EST LOW 30: CPT | Performed by: SPECIALIST

## 2024-07-10 PROCEDURE — 99213 OFFICE O/P EST LOW 20 MIN: CPT | Performed by: SPECIALIST

## 2024-07-10 RX ORDER — ASPIRIN 81 MG/1
81 TABLET ORAL DAILY
COMMUNITY

## 2024-07-10 NOTE — PROGRESS NOTES
[facility] Vein Consult      Assessment:     1. varicose veins, left   2. spider veins, bilateral     Plan:     1. Treatment options of conservative therapy of stockings use, exercise, weight loss, elevating legs when possible.    2. Script for compression stockings 20-30 mm hg  3. Ultrasound to evaluate legs for incompetency of both deep and superficial system .   4. Surgical treatment, discussed briefly today  5. Follow up: 2 months.   6. Call for any questions concerns or issues    Subjective:      Osei Rain is a 49 year old male  who was referred by Lore Schultz  for evaluation of varicose veins. Symptoms include pain, aching, fatigue, burning, edema, and dermatitis. Patient has history of leg selling, pain and vein issues that have progressed. Pain and symptoms have affected daily living and work activities needing medications. Here for evaluation today. Stocking use with compression stockings of 20-30 mm hg or greater for one month    Allergies:Bee venom protein (honey bee) [bee venom] and Hymenoptera allergenic extract [wasp venom protein]    History reviewed. No pertinent past medical history.    Past Surgical History:   Procedure Laterality Date    ARTHROSCOPY KNEE Left     dislocated the knee cap several times     COLONOSCOPY N/A 12/15/2022    Procedure: COLONOSCOPY;  Surgeon: Connor Leach MD;  Location:  GI         Current Outpatient Medications:     aspirin 81 MG EC tablet, Take 81 mg by mouth daily, Disp: , Rfl:     acetaminophen (TYLENOL) 500 MG tablet, [ACETAMINOPHEN (TYLENOL) 500 MG TABLET] Take 500 mg by mouth every 6 (six) hours as needed for pain. (Patient not taking: Reported on 6/13/2024), Disp: , Rfl:     bisacodyl (DULCOLAX) 5 MG EC tablet, Take as directed. One day before exam take 2 tablets at 3 PM. Take 2 tablets at 11 PM. (Patient not taking: Reported on 6/13/2024), Disp: 4 tablet, Rfl: 0    polyethylene glycol (GOLYTELY) 236 g suspension, Take as directed. One day  before exam fill the jug with water. Cover and shake until well mixed. At 6 PM start drinking an 8oz glass of mixture every 15 minutes until jug is 1/2 empty. Store remainder in the refrigerator.  At 11 PM Start drinking the other half of the Golytely jug. Drink one 8-ounce glass every 15 minutes until the jug is empty. (Patient not taking: Reported on 6/13/2024), Disp: 4000 mL, Rfl: 0     Family History   Problem Relation Age of Onset    Chronic Obstructive Pulmonary Disease Mother     No Known Problems Father     No Known Problems Brother         reports that he has quit smoking. His smoking use included cigarettes. He has never used smokeless tobacco. He reports current alcohol use. He reports current drug use. Drug: Marijuana.      Review of Systems:    Pertinent items are noted in HPI.  Patient has symptomatic veins and changes of bilateral legs. These have progressed to the point of causing symptoms on a daily basis. This causes issues with daily activities and chores such as mowing lawn, outdoor upkeep, and standing for long lengths of time       Objective:     There were no vitals filed for this visit.  There is no height or weight on file to calculate BMI.    EXAM:  GENERAL: This is a well-developed 49 year old male who appears his stated age  HEAD: normocephalic  HEENT: Pupils equal and reactive bilaterally  MOUTH: mucus membranes intact. Normal dentation  CARDIAC: RRR without murmur  CHEST/LUNG:  Clear to auscultation bilaterally  ABDOMEN: Soft, nontender, nondistended, no masses noted   NEUROLOGIC: Focally intact, nonfocal, alert and oriented x 3  INTEGUMENT: No open lesions or ulcers  VASCULAR: Pulses intact, symmetrical upper and lower extremities. There areskin changes consistent with chronic venous insufficiency. Varicose veins present in bilateral greater saphenous distribution. Spider veins present bilateral.                              Side:: Left  VCSS  PAIN:: Mild: Occasional, not restricting  activity of requiring pain medication  Varicose Veins:: Mild: Few scattered  Venous Edema:: Mild: Evening ankle swelling only  Skin Pigmentation:: Absent: None  Inflamation:: Absent: None  Induration:: Absent: None  Number of active ulcers:: 0  Active ulcer duration:: None  Active ulcer diameter:: None  Compression Therapy:: Full compliance stockings + elevation  VCSS Score:: 6  CEAP:: Simple varicose veins only    Imaging:    pending    Ulices Bragg MD  General Surgery [phone]  Vascular Surgery [phone]

## 2024-07-10 NOTE — PROGRESS NOTES
[facility]        Patient is here for a consult to discuss varicose vein(s). The patient has varicose veins that are problematic in left legs. Symptoms patient has been experiencing are  bruising, pain, bulging, warm to touch, discoloration, and  swelling. Has hx of left knee injury. Patient states their varicose veins are bothersome when standing and sitting. Patient has been using pain medication or anti-inflammatory's. Patient has not had recent imaging on legs done. Patient has done conservative measures which include: compression stockings for almost a month.Treatment has been successful due to compression being helpful. Educated patient to work on conservative treatments.  RTC in 2 weeks with US prior.    Earlier this year, pt was flying and pt woke up and fainted on the plane. Went to ER in [place]     Pt is currently taking Aspirin.    The provider has been notified that the patient has no concerns.     Questions patient would like addressed today are: N/A.    Refills are needed: N/A    Has homecare services and agency name:  No

## 2024-09-18 ENCOUNTER — OFFICE VISIT (OUTPATIENT)
Dept: VASCULAR SURGERY | Facility: CLINIC | Age: 49
End: 2024-09-18
Attending: SPECIALIST
Payer: COMMERCIAL

## 2024-09-18 ENCOUNTER — ANCILLARY PROCEDURE (OUTPATIENT)
Dept: VASCULAR ULTRASOUND | Facility: CLINIC | Age: 49
End: 2024-09-18
Attending: SPECIALIST
Payer: COMMERCIAL

## 2024-09-18 VITALS
DIASTOLIC BLOOD PRESSURE: 88 MMHG | OXYGEN SATURATION: 97 % | SYSTOLIC BLOOD PRESSURE: 132 MMHG | HEART RATE: 58 BPM | TEMPERATURE: 97.8 F

## 2024-09-18 DIAGNOSIS — I83.893 SYMPTOMATIC VARICOSE VEINS OF BOTH LOWER EXTREMITIES: Primary | ICD-10-CM

## 2024-09-18 DIAGNOSIS — I83.893 SYMPTOMATIC VARICOSE VEINS OF BOTH LOWER EXTREMITIES: ICD-10-CM

## 2024-09-18 PROCEDURE — 99213 OFFICE O/P EST LOW 20 MIN: CPT | Performed by: SPECIALIST

## 2024-09-18 PROCEDURE — 93970 EXTREMITY STUDY: CPT

## 2024-09-18 PROCEDURE — 93970 EXTREMITY STUDY: CPT | Mod: 26 | Performed by: SURGERY

## 2024-09-18 ASSESSMENT — PAIN SCALES - GENERAL: PAINLEVEL: MODERATE PAIN (5)

## 2024-09-18 NOTE — PATIENT INSTRUCTIONS
Pre-Procedure Instructions for Varicose Vein Ablation   We look forward to scheduling a varicose vein procedure for you. First, we will submit a prior authorization to your insurance company if required. This typically takes 10-14 days. We will contact you once we have gotten the approval to schedule the procedure.  The following is helpful information for you regarding your treatment:  **Important: A  will be needed post procedure.  (Unable to use Taxi or Uber).  Please allow 1-2 hours for your vein procedure appointment.  Take your routine medications as you normally would except for blood thinners. Aspirin is ok to continue.  If you take Warfarin, Xarelto, or Eliquis this will need to be HELD prior to procedure according to primary care provider or cardiology who prescribes this medication. Please notify us if you take this medication.  We have thigh high compression stocking sizes medium to X-large that will be applied immediately after the procedure. You will need to provide your own if one of these sizes will not fit. Another option is to bring in knee high compression and biker compression shorts.   Please wear comfortable clothing.  We recommend that you bring a change of undergarment in case it gets stained by the cleansing solution.  Feel free to bring a personal music player or a CD to listen to during your procedure.  It is advised not to fly within 3 weeks after the procedure.  You do not have to fast prior to this procedure.  For any questions regarding your procedure please call [phone] to speak with the nurse.  If you would like a Good Lucy Estimate for your upcoming procedure contact Cost of Care Estimates at [phone], advocates are available Monday through Friday 8am - 4:30pm.    Radiofrequency Ablation Codes:   - 00577 for first vein in either leg  Varicose veins may be a sign of something more severe - venous reflux disease.  Healthy leg veins have valves that keep blood flowing  to the heart. Venous reflux disease develops when the valves stop working properly and allow blood to flow backward (i.e., reflux) and pool in the lower leg veins.   If venous reflux disease is left untreated, symptoms can worsen over time. Your doctor can help you understand if you have this condition.     Superficial venous reflux disease may cause the following signs and symptoms in your legs:  Varicose veins  Aching  Swelling  Cramping  Heaviness or tiredness  Itching  Restlessness  Open skin sores    Superficial venous reflux disease treatment aims to reduce or stop the backward flow of blood. The following may be prescribed to treat your superficial venous reflux disease. Your doctor can help you decide which treatment is best for you:   Compression stockings   Removing diseased vein   Closing diseased vein (through thermal or non-thermal treatment)     Radiofrequency Ablation (RFA) Treatment for Varicose Veins  Radiofrequency ablation (RFA) is a thermal procedure to treat varicose veins. It uses heat created from radiofrequency (RF). During RFA treatment, RF heat is sent into your vein through a thin, flexible tube (catheter). This closes off blood flow in the main problem vein.           Getting ready for your treatment  Tell your provider if you:  Are pregnant or think you may be pregnant  Are breastfeeding  Smoke, use alcohol or street drugs on a regular basis  Have any allergies or intolerances to certain medicines. Explain what reaction you have had to these medicines in the past.      The day of your treatment  The treatment takes 45 to 60 minutes. The entire treatment (including time to prepare and recover) takes about 1 to 3 hours. You can go home the same day. For the treatment:   You'll lie down on a hospital bed.  An imaging method, such as ultrasound, is used to guide the procedure.  The leg to be treated is injected with numbing medicine.  Once your leg is numb, a needle makes a small hole  (puncture) in the vein to be treated.  The catheter with the RF heat source is inserted into your vein.  More numbing medicine may be injected around your vein.  Once the catheter is in the right position, it is then slowly drawn backward. As the catheter sends out heat, the vein is closed off.  In some cases, other side branch varicose veins may be removed or tied off through a few small cuts (incisions).  When the treatment is done, the catheter is removed. Pressure is applied to the insertion site to stop any bleeding. An elastic compression stocking or a bandage may then be put on your leg.       Recovering at home  Once at home, follow all the instructions you've been given. Be sure to:  Check for signs of infection at the catheter insertion sites (see below)  Wear thigh high compressions as directed  Keep your legs raised (elevated) as directed  Walk a few times a day  Avoid heavy exercise, lifting, and standing for long periods as advised. No lifting >20lbs for 2 weeks.   Avoid air travel, hot baths, saunas, or whirlpools as advised  Do not drive or operate heavy machinery for 24 hours after the procedure  Leakage from the numbing medications the first few hours is normal.          Call your healthcare provider if you have any of the following:  Fever of 100.4 F (38 C) or higher, or as directed by your provider  Chest pain or trouble breathing  Signs of infection at the catheter insertion site. These include increased redness or swelling (inflammation), warmth, increasing pain, bleeding, or bad-smelling discharge.  Severe numbness or tingling in the treated leg  Severe pain or swelling in the treated leg      Follow-up  You'll have an ultrasound within the same week as the procedure to check for problems, such as blood clots. You will follow up with the provider after 6 weeks.   Risks and possible complications   These include the following:  Bleeding, Infection, Blood clots, Damage to the nerves in the  treated area, Irritation or burning of the skin over the treated vein. Treatment doesn't improve the look or the symptoms of the problem veins  Risks of any medicines used during the treatment

## 2024-09-18 NOTE — PROGRESS NOTES
[facility]    Patient is here for a 3 month follow up  to discuss LLE varicose veins. Compression has significantly helped.  Aching in left calf. Notices that it's hot to the touch. Reviewed ultrasound and will prior auth Pre-auth L SSV RFA [id number] [facility].    Pt is currently taking Aspirin.    /88   Pulse 58   Temp 97.8  F (36.6  C)   SpO2 97%     The provider has been notified that the patient has no concerns.     Questions patient would like addressed today are: N/A.    Refills are needed: N/A    Has homecare services and agency name:  No

## 2024-09-26 NOTE — PROGRESS NOTES
Follow Up: Varicose Veins/ Venous Insufficiency    Osei Rain is a 49 year old  male here for followup. He has worn stockings now for 3          months. I saw them previously in July 2024.  Continued progression of disease and symptoms and issues; reviewed ultrasound results. Patient has ongoing symptoms with pain and swelling needing intervention with pain meds secondary to interfering with daily activities and work. This now inhibits daily chores and activities.     Allergies:   Allergies   Allergen Reactions    Bee Venom Protein (Honey Bee) [Bee Venom] Swelling    Hymenoptera Allergenic Extract [Wasp Venom Protein] Unknown        Past Medical History: History reviewed. No pertinent past medical history.     Past Social History:   Past Surgical History:   Procedure Laterality Date    ARTHROSCOPY KNEE Left     dislocated the knee cap several times     COLONOSCOPY N/A 12/15/2022    Procedure: COLONOSCOPY;  Surgeon: Connor Leach MD;  Location: [facility]        CURRENT MEDS:  Current Outpatient Medications   Medication Sig Dispense Refill    aspirin 81 MG EC tablet Take 81 mg by mouth daily      acetaminophen (TYLENOL) 500 MG tablet [ACETAMINOPHEN (TYLENOL) 500 MG TABLET] Take 500 mg by mouth every 6 (six) hours as needed for pain. (Patient not taking: Reported on 6/13/2024)      bisacodyl (DULCOLAX) 5 MG EC tablet Take as directed. One day before exam take 2 tablets at 3 PM. Take 2 tablets at 11 PM. (Patient not taking: Reported on 6/13/2024) 4 tablet 0    polyethylene glycol (GOLYTELY) 236 g suspension Take as directed. One day before exam fill the jug with water. Cover and shake until well mixed. At 6 PM start drinking an 8oz glass of mixture every 15 minutes until jug is 1/2 empty. Store remainder in the refrigerator.  At 11 PM Start drinking the other half of the Golytely jug. Drink one 8-ounce glass every 15 minutes until the jug is empty. (Patient not taking: Reported on 6/13/2024) 4000 mL 0     Current  Facility-Administered Medications   Medication Dose Route Frequency Provider Last Rate Last Admin    lidocaine 112 mL, EPINEPHrine (ADRENALIN) 1.12 mg in sodium chloride 0.9 % 1,113.12 mL (TUMESCENT)   Irrigation Once Ulices Bragg MD           Family History   Problem Relation Age of Onset    Chronic Obstructive Pulmonary Disease Mother     No Known Problems Father     No Known Problems Brother         reports that he has quit smoking. His smoking use included cigarettes. He has never used smokeless tobacco. He reports current alcohol use. He reports current drug use. Drug: Marijuana.    Review of Systems:  Negative except progression of disease and issues while wearing compression and conservative management.   Patient has symptomatic veins and changes of bilateral legs. These have progressed to the point of causing symptoms on a daily basis. This causes issues with daily activities and chores such as mowing lawn, outdoor upkeep, and standing for long lengths of time. He has worn compression now for greater than 3 months, worked on an exercise and weight loss program and continues to have symptoms.     OBJECTIVE:  Vitals:    09/18/24 1050   BP: 132/88   Pulse: 58   Temp: 97.8  F (36.6  C)   SpO2: 97%     There is no height or weight on file to calculate BMI.    EXAM:  GENERAL: This is a well-developed 49 year old male who appears his stated age  HEAD: normocephalic  HEENT: Pupils equal and reactive bilaterally  CARDIAC: RRR without murmur  CHEST/LUNG:  Clear to auscultation  ABDOMEN: Soft, nontender, nondistended, no masses    NEUROLOGIC: Focally intact, nonfocal  VASCULAR: Pulses intact, symmetrical upper and lower extremities. Varicose veins and Spider veins, bilateral                               Side:: Left  VCSS  PAIN:: Mild: Occasional, not restricting activity of requiring pain medication  Varicose Veins:: Mild: Few scattered  Venous Edema:: Mild: Evening ankle swelling only  Skin Pigmentation::  Absent: None  Inflamation:: Absent: None  Induration:: Absent: None  Number of active ulcers:: 0  Active ulcer duration:: None  Active ulcer diameter:: None  Compression Therapy:: Full compliance stockings + elevation  VCSS Score:: 6  CEAP:: Simple varicose veins only  Patient reported symptoms  Heaviness: none of the time  Achiness: some of the time  Swelling: some of the time  Throbbing: a little of the time  Itching: none of the time  Appearance: some of the time  Impact on work/activity: none of the time    Imaging:    Reviewed US results showing left short or small saphenous vein insuffiencey.    Exam Information    Exam Date Exam Time Accession # Performing Department Results    9/18/24 10:59 AM [id number] [facility] [place]      PACS Images     Show images for US Venous Competency Bilateral     Study Result    Narrative & Impression   BILATERAL Venous Insufficiency Ultrasound (Date: 09/18/24)    BILATERAL Lower Extremity          Indication:  Surveillance Bilateral Leg Symptomatic Varicose Veins, Pain, and Swelling. More Left Posterior Calf.      Previous: None     Patient History: Swelling     Presenting Symptoms:  Swelling, Varicose Veins, and Pain     Technique:   Supine and Reverse Trendelenburg Ultrasound of the Deep and Superficial Veins with Valsalva and Compression Augmentation Maneuvers. Duplex Imaging is performed utilizing gray-scale, Two-dimensional images, color-flow imaging, Doppler waveform analysis, and Spectral doppler imaging done with provacative maneuvers.      Incompetency Criteria:  Deep vein reflux reported when greater than 1000 msec flow reversal. Superficial vein reflux reported when equal to or greater than 600 msec flow reversal.  vein reflux reported as greater than 350 msec flow reversal.      Right  Leg Deep Veins    CFV SFJ DFV PROX FV   PROX FV MID FV DIST POP V. PERON.   V. PTV'S   Compressibility  (FC,PC,NC) FC FC FC FC  FC FC FC FC FC   Reflux -   - - - - -   -         Right Leg Superficial Veins  Location SFJ PROX THIGH MID THIGH KNEE MID CALF   GSV (mm) 5.5 3.8 4.4 4.1 3.1   Reflux - - - + -   AASV (mm) 2.6           Reflux  -           PASV (mm) 3.2           Reflux -              Location SPJ PROX CALF MID CALF   SSV (mm) 4.0 4.5 4.0   Reflux - - -      Left  Leg Deep Veins    CFV SFJ DFV PROX FV   PROX FV MID FV DIST POP V. PERON.   V. PTV'S   Compressibility  (FC,PC,NC) FC FC FC FC FC FC FC FC FC   Reflux -   - - - - +   -         Left Leg Superficial Veins  Location SFJ PROX THIGH MID THIGH KNEE MID CALF   GSV (mm) 6.4 6.1 5.1 4.8 3.0   Reflux - - - - -   PASV (mm) 3.7           Reflux -              Location SPJ PROX CALF MID CALF   SSV (mm) 8.0 7.3 6.0   Reflux + + -      Comments: No incompetent  veins visualized.         Left SSV is straight enough to ablate. Left SSV Mid Calf Varicose Vein Posterior (6.3mm/ 271 ms)      Impression:       Right Deep Vein Findings: Patent deep venous system with no evidence of DVT and no evidence of reflux     Left Deep Vein Findings: Patent deep venous system with no evidence of DVT and significant reflux at the popliteal vein level     Superficial Vein Findings:      Right Greater Saphenous Vein: Patent Greater Saphenous vein with Reflux noted at the knee level with a Maximum diameter of 5.5 mm .     Right Small Saphenous Vein: Patent Small Saphenous Vein without evidence of reflux.     Left Greater Saphenous Vein: Patent Greater Saphenous Vein without evidence of reflux.     Left Small Saphenous Vein: Patent Small Saphenous vein with Reflux noted at the saphenous popliteal junction and proximal thigh levels with a Maximum diameter of 8 mm .     Perforating and Accessory Veins: No additional reflux noted     Reference:     Compressibility: FC= Fully compressible, PC= Partially compressible, NC= Non-compressible, NV= Not Visualized     Reflux: (+) Incompetent  (-) Competent,  (NV) = Not Visualized     Interpretation criteria:          Duration of Retrograde flow (milliseconds)  Category Deep Veins Superficial Veins  veins   Competent < 1000ms < 500ms < 350ms   Incompetent > 1000ms > 500ms > 350ms           Assessment/Plan:    She has  incompetency and insufficiency of the Left  Short  saphenous vein.  Left short saphenous vein measures 8.0  mm  at the junction. Deep systems are intact. No DVTs. Great candidate for endovenous  closure. We spent 20 today and discussed the procedure. The risks of anesthesia, infection, bleeding, clotting, DVTs, numbess at the insertion site dermatome and  the process and procedure were discussed. Answered all questions today. Will submit this to [employer] if needed for pre approval.Will set this up when approved. Discussed need to have a  and procedure woul take around 30 minutes with total time 2-3 hours. Also understands a small screening ultrasound 2-3 days out to rule out clot formation at the closed vessel.    DIAGNOSIS: Venous insufficiency of the  left short saphenous vein      PROCEDURE: Endovenous closure of the left short saphenous vein    Ulices Bragg MD  General Surgery [phone]  Vascular Surgery [phone]

## 2024-10-01 ENCOUNTER — TELEPHONE (OUTPATIENT)
Dept: VASCULAR SURGERY | Facility: CLINIC | Age: 49
End: 2024-10-01
Payer: COMMERCIAL

## 2024-10-01 NOTE — TELEPHONE ENCOUNTER
Reviewed below message regarding preauth vein ablation. Left message with concerns of out of pocket costs. Insurance will not preauth until scheduled. Advised patient call the cost of care line and let us know when he would like to proceed with scheduling and preauth post schedule.          Spoke with Juan at [facility] to attempt to initiate precert request for venous closure. Per Juan, since they are a private share plan, they do not do authorizations; they do a pre-notification for services and cannot initiate until services have been scheduled. After procedure is scheduled, will need to call: [phone] and provide CPTs, DOS, etc.     Patient has a non-shareable amount (similar to deductible) of $2,500 and has met about $422 so far. After non-shareable amount is met, shareable amount covers 75% of services and patient will be responsible for 25%. If patient has pre-existing conditions they may not qualify for shareable amount.

## 2024-10-17 NOTE — TELEPHONE ENCOUNTER
LMTCB#2 to see if patient is interested in scheduling venous closure and/or called cost of care. Due to [employer] Plan, patient may have a large out of pocket expense if he decides to proceed. [phone]

## 2025-01-18 ENCOUNTER — HEALTH MAINTENANCE LETTER (OUTPATIENT)
Age: 50
End: 2025-01-18

## 2025-06-16 NOTE — PROGRESS NOTES
Assessment / Impression     1. Strep throat     2. Lymphadenopathy  Rapid Strep A Screen-Throat    amoxicillin-clavulanate (AUGMENTIN) 875-125 mg per tablet    Mononucleosis Screen   3. Fatigue, unspecified type  Mononucleosis Screen         Plan:     He was given a prescription for Augmentin to treat the lymphadenopathy.  After he left the office the strep test results came back positive.  This antibiotic should provide adequate coverage.  I encouraged rest and hydration.  He may take Tylenol or ibuprofen as needed.  If symptoms not improve he will let us know.  His mono test came back negative.    Subjective:      HPI: Osei Rain is a 45 y.o. male who presents to the clinic complaining of a bump in the left neck area just below his jaw.  This started about a week ago.  He reports that he is not feeling 100%.  He has been feeling more tired than usual as well.  He denies having fevers or sore throat symptoms.  His throat has felt a little irritated though.  He denies having a history of similar symptoms.  He is not congested, coughing or short of breath.  He has tried applying heat, taking Advil and Tylenol which have not helped much.      Review of Systems  Pertinent items are noted in HPI.       Objective:     /82 (Patient Site: Right Arm, Patient Position: Sitting, Cuff Size: Adult Regular)   Pulse 64   Ht 6' (1.829 m)   Wt 218 lb 11.2 oz (99.2 kg)   BMI 29.66 kg/m      General Appearance: Alert, cooperative, appears slightly fatigued.  Head: Normocephalic, without obvious abnormality, atraumatic.  Eyes: PERRL, conjunctiva/corneas clear, EOM's intact.  Ears: Normal TM's and external ear canals, both ears.  Throat: Slightly erythematous. No exudates.  Neck: Supple, symmetrical, trachea midline, there are palpable tender lymph nodes in the left anterior chain.  Lungs: Clear to auscultation bilaterally, respirations unlabored.  Heart:: Regular rate and rhythm.  Extremities: Extremities normal,  atraumatic, no cyanosis or edema.        Recent Results (from the past 168 hour(s))   Rapid Strep A Screen-Throat    Specimen: Throat   Result Value Ref Range    Rapid Strep A Antigen Group A Strep detected (!) No Group A Strep detected, presumptive negative   Mononucleosis Screen   Result Value Ref Range    Mono Screen Negative Negative          .

## 2025-07-21 ENCOUNTER — ANCILLARY PROCEDURE (OUTPATIENT)
Dept: VASCULAR ULTRASOUND | Facility: CLINIC | Age: 50
End: 2025-07-21
Attending: SPECIALIST
Payer: COMMERCIAL

## 2025-07-21 DIAGNOSIS — I83.893 SYMPTOMATIC VARICOSE VEINS OF BOTH LOWER EXTREMITIES: ICD-10-CM

## 2025-07-21 PROCEDURE — 93970 EXTREMITY STUDY: CPT | Mod: 26 | Performed by: STUDENT IN AN ORGANIZED HEALTH CARE EDUCATION/TRAINING PROGRAM

## 2025-07-21 PROCEDURE — 93970 EXTREMITY STUDY: CPT

## 2025-07-29 ENCOUNTER — OFFICE VISIT (OUTPATIENT)
Dept: VASCULAR SURGERY | Facility: CLINIC | Age: 50
End: 2025-07-29
Attending: SPECIALIST
Payer: COMMERCIAL

## 2025-07-29 ENCOUNTER — TELEPHONE (OUTPATIENT)
Dept: VASCULAR SURGERY | Facility: CLINIC | Age: 50
End: 2025-07-29

## 2025-07-29 VITALS
DIASTOLIC BLOOD PRESSURE: 84 MMHG | TEMPERATURE: 97.9 F | RESPIRATION RATE: 20 BRPM | SYSTOLIC BLOOD PRESSURE: 126 MMHG | HEART RATE: 68 BPM | OXYGEN SATURATION: 97 %

## 2025-07-29 DIAGNOSIS — I83.893 SYMPTOMATIC VARICOSE VEINS OF BOTH LOWER EXTREMITIES: Primary | ICD-10-CM

## 2025-07-29 DIAGNOSIS — I83.92 VARICOSE VEINS OF LEFT LOWER EXTREMITY, UNSPECIFIED WHETHER COMPLICATED: ICD-10-CM

## 2025-07-29 PROCEDURE — 1125F AMNT PAIN NOTED PAIN PRSNT: CPT | Performed by: SPECIALIST

## 2025-07-29 PROCEDURE — G2211 COMPLEX E/M VISIT ADD ON: HCPCS | Performed by: SPECIALIST

## 2025-07-29 PROCEDURE — G0463 HOSPITAL OUTPT CLINIC VISIT: HCPCS | Performed by: SPECIALIST

## 2025-07-29 PROCEDURE — 3079F DIAST BP 80-89 MM HG: CPT | Performed by: SPECIALIST

## 2025-07-29 PROCEDURE — 3074F SYST BP LT 130 MM HG: CPT | Performed by: SPECIALIST

## 2025-07-29 PROCEDURE — 99214 OFFICE O/P EST MOD 30 MIN: CPT | Performed by: SPECIALIST

## 2025-07-29 ASSESSMENT — PAIN SCALES - GENERAL: PAINLEVEL_OUTOF10: MODERATE PAIN (4)

## 2025-07-29 NOTE — PROGRESS NOTES
Vascular Clinic Rooming Questions      Patient is here for 9 month follow up  for Varicose Veins. The patient does  wear compression stockings. The patient has varicose veins that are problematic in left legs. Patient states their varicose veins are bothersome when all the time, standing, sitting, working, and household chores. The patient has not been using medications for their leg discomfort.      /84   Pulse 68   Temp 97.9  F (36.6  C)   Resp 20   SpO2 97%     The provider has been notified that the patient has no concerns.     Questions patient would like addressed today are: N/A.    Refills are needed: No    Has homecare services and agency name:  No

## 2025-07-29 NOTE — PATIENT INSTRUCTIONS
Pre-Procedure Instructions for Varicose Vein Ablation   We look forward to scheduling a varicose vein procedure for you. First, we will submit a prior authorization to your insurance company if required. This typically takes 10-14 days. We will contact you once we have gotten the approval to schedule the procedure.  The following is helpful information for you regarding your treatment:  **Important: A  will be needed post procedure.  (Unable to use Taxi or Uber).  Please allow 1-2 hours for your vein procedure appointment.  Take your routine medications as you normally would except for blood thinners. Aspirin is ok to continue.  If you take Warfarin, Xarelto, or Eliquis this will need to be HELD prior to procedure according to primary care provider or cardiology who prescribes this medication. Please notify us if you take this medication.  We have thigh high compression stocking sizes medium to X-large that will be applied immediately after the procedure. You will need to provide your own if one of these sizes will not fit. Another option is to bring in knee high compression and biker compression shorts.   Please wear comfortable clothing.  We recommend that you bring a change of undergarment in case it gets stained by the cleansing solution.  Feel free to bring a personal music player or a CD to listen to during your procedure.  It is advised not to fly within 3 weeks after the procedure.  You do not have to fast prior to this procedure.  For any questions regarding your procedure please call [phone] to speak with the nurse.  If you would like a Good Lucy Estimate for your upcoming procedure contact Cost of Care Estimates at [phone], advocates are available Monday through Friday 8am - 4:30pm.    Radiofrequency Ablation Codes:   - 10611 for first vein in either leg  Varicose veins may be a sign of something more severe - venous reflux disease.  Healthy leg veins have valves that keep blood flowing  to the heart. Venous reflux disease develops when the valves stop working properly and allow blood to flow backward (i.e., reflux) and pool in the lower leg veins.   If venous reflux disease is left untreated, symptoms can worsen over time. Your doctor can help you understand if you have this condition.     Superficial venous reflux disease may cause the following signs and symptoms in your legs:  Varicose veins  Aching  Swelling  Cramping  Heaviness or tiredness  Itching  Restlessness  Open skin sores    Superficial venous reflux disease treatment aims to reduce or stop the backward flow of blood. The following may be prescribed to treat your superficial venous reflux disease. Your doctor can help you decide which treatment is best for you:   Compression stockings   Removing diseased vein   Closing diseased vein (through thermal or non-thermal treatment)     Radiofrequency Ablation (RFA) Treatment for Varicose Veins  Radiofrequency ablation (RFA) is a thermal procedure to treat varicose veins. It uses heat created from radiofrequency (RF). During RFA treatment, RF heat is sent into your vein through a thin, flexible tube (catheter). This closes off blood flow in the main problem vein.           Getting ready for your treatment  Tell your provider if you:  Are pregnant or think you may be pregnant  Are breastfeeding  Smoke, use alcohol or street drugs on a regular basis  Have any allergies or intolerances to certain medicines. Explain what reaction you have had to these medicines in the past.      The day of your treatment  The treatment takes 45 to 60 minutes. The entire treatment (including time to prepare and recover) takes about 1 to 3 hours. You can go home the same day. For the treatment:   You'll lie down on a hospital bed.  An imaging method, such as ultrasound, is used to guide the procedure.  The leg to be treated is injected with numbing medicine.  Once your leg is numb, a needle makes a small hole  (puncture) in the vein to be treated.  The catheter with the RF heat source is inserted into your vein.  More numbing medicine may be injected around your vein.  Once the catheter is in the right position, it is then slowly drawn backward. As the catheter sends out heat, the vein is closed off.  In some cases, other side branch varicose veins may be removed or tied off through a few small cuts (incisions).  When the treatment is done, the catheter is removed. Pressure is applied to the insertion site to stop any bleeding. An elastic compression stocking or a bandage may then be put on your leg.       Recovering at home  Once at home, follow all the instructions you've been given. Be sure to:  Check for signs of infection at the catheter insertion sites (see below)  Wear thigh high compressions as directed  Keep your legs raised (elevated) as directed  Walk a few times a day  Avoid heavy exercise, lifting, and standing for long periods as advised. No lifting >20lbs for 2 weeks.   Avoid air travel, hot baths, saunas, or whirlpools as advised  Do not drive or operate heavy machinery for 24 hours after the procedure  Leakage from the numbing medications the first few hours is normal.          Call your healthcare provider if you have any of the following:  Fever of 100.4 F (38 C) or higher, or as directed by your provider  Chest pain or trouble breathing  Signs of infection at the catheter insertion site. These include increased redness or swelling (inflammation), warmth, increasing pain, bleeding, or bad-smelling discharge.  Severe numbness or tingling in the treated leg  Severe pain or swelling in the treated leg      Follow-up  You'll have an ultrasound within the same week as the procedure to check for problems, such as blood clots. You will follow up with the provider after 6 weeks.   Risks and possible complications   These include the following:  Bleeding, Infection, Blood clots, Damage to the nerves in the  treated area, Irritation or burning of the skin over the treated vein. Treatment doesn't improve the look or the symptoms of the problem veins  Risks of any medicines used during the treatment

## 2025-07-30 NOTE — PROGRESS NOTES
Follow Up: Varicose Veins/ Venous Insufficiency    Osei Rain is a 50 year old  male here for followup. He has worn stockings now for 10  months. I saw them previously in September 2024.  Continued progression of disease and symptoms and issues; reviewed ultrasound results. Patient has ongoing symptoms with pain and swelling needing intervention with pain meds secondary to interfering with daily activities and work. This now inhibits daily chores and activities.     Allergies:   Allergies   Allergen Reactions    Bee Venom Protein (Honey Bee) [Bee Venom] Swelling    Hymenoptera Allergenic Extract [Wasp Venom Protein] Unknown        Past Medical History: History reviewed. No pertinent past medical history.     Past Social History:   Past Surgical History:   Procedure Laterality Date    ARTHROSCOPY KNEE Left     dislocated the knee cap several times     COLONOSCOPY N/A 12/15/2022    Procedure: COLONOSCOPY;  Surgeon: Connor Leach MD;  Location: [facility]        CURRENT MEDS:  Current Outpatient Medications   Medication Sig Dispense Refill    acetaminophen (TYLENOL) 500 MG tablet [ACETAMINOPHEN (TYLENOL) 500 MG TABLET] Take 500 mg by mouth every 6 (six) hours as needed for pain. (Patient not taking: Reported on 6/13/2024)      aspirin 81 MG EC tablet Take 81 mg by mouth daily      bisacodyl (DULCOLAX) 5 MG EC tablet Take as directed. One day before exam take 2 tablets at 3 PM. Take 2 tablets at 11 PM. (Patient not taking: Reported on 6/13/2024) 4 tablet 0    polyethylene glycol (GOLYTELY) 236 g suspension Take as directed. One day before exam fill the jug with water. Cover and shake until well mixed. At 6 PM start drinking an 8oz glass of mixture every 15 minutes until jug is 1/2 empty. Store remainder in the refrigerator.  At 11 PM Start drinking the other half of the Golytely jug. Drink one 8-ounce glass every 15 minutes until the jug is empty. (Patient not taking: Reported on 6/13/2024) 4000 mL 0     Current  Facility-Administered Medications   Medication Dose Route Frequency Provider Last Rate Last Admin    lidocaine 112 mL, EPINEPHrine (ADRENALIN) 1.12 mg in sodium chloride 0.9 % 1,113.12 mL (TUMESCENT)   Irrigation Once Ulices Bragg MD        lidocaine 112 mL, EPINEPHrine (ADRENALIN) 1.12 mg in sodium chloride 0.9 % 1,113.12 mL (TUMESCENT)   Irrigation Once Ulices Bragg MD           Family History   Problem Relation Age of Onset    Chronic Obstructive Pulmonary Disease Mother     No Known Problems Father     No Known Problems Brother         reports that he has quit smoking. His smoking use included cigarettes. He has never used smokeless tobacco. He reports current alcohol use. He reports current drug use. Drug: Marijuana.    Review of Systems:  Negative except progression of disease and issues while wearing compression and conservative management.   Patient has symptomatic veins and changes of bilateral legs. These have progressed to the point of causing symptoms on a daily basis. This causes issues with daily activities and chores such as mowing lawn, outdoor upkeep, and standing for long lengths of time. He has worn compression now for greater than 3 months, worked on an exercise and weight loss program and continues to have symptoms.     OBJECTIVE:  Vitals:    07/29/25 1056   BP: 126/84   Pulse: 68   Resp: 20   Temp: 97.9  F (36.6  C)   SpO2: 97%     There is no height or weight on file to calculate BMI.    EXAM:  GENERAL: This is a well-developed 50 year old male who appears his stated age  HEAD: normocephalic  HEENT: Pupils equal and reactive bilaterally  CARDIAC: RRR without murmur  CHEST/LUNG:  Clear to auscultation  ABDOMEN: Soft, nontender, nondistended, no masses    NEUROLOGIC: Focally intact, nonfocal  VASCULAR: Pulses intact, symmetrical upper and lower extremities. Varicose veins, Spider veins, and Chronic venous stasis changes, bilateral                               Side::  Left  VCSS  PAIN:: Mild: Occasional, not restricting activity of requiring pain medication  Varicose Veins:: Mild: Few scattered  Venous Edema:: Mild: Evening ankle swelling only  Skin Pigmentation:: Absent: None  Inflamation:: Absent: None  Induration:: Absent: None  Number of active ulcers:: 0  Active ulcer duration:: None  Active ulcer diameter:: None  Compression Therapy:: Full compliance stockings + elevation  VCSS Score:: 6  CEAP:: Simple varicose veins only  Patient reported symptoms  Vein Appearance: Moderately noticable  Heaviness: all of the time  Achiness: all of the time  Swelling: some of the time  Throbbing: some of the time  Itching: some of the time  Impact on work/activity: Symptoms but fully able to participate    Imaging    Reviewed and my interpretation of the US is                  Reviewed ultrasound interpretation showing left short saphenous vein insufficiency and reflux.      Exam Information    Exam Date Exam Time Accession # Performing Department Results    7/21/25  2:29 PM [id number] [facility] [place] [facility]      PACS Images     Show images for US Venous Competency Bilateral     Study Result    Narrative & Impression   BILATERAL Venous Insufficiency Ultrasound (Date: 07/21/25)    BILATERAL Lower Extremity          Indication:  Surveillance Bilateral Leg Symptomatic Varicose Veins, Pain, and Swelling. More Left Posterior Calf.      Previous: 9/18/2024     Patient History: Swelling     Presenting Symptoms:  Swelling, Varicose Veins, and Pain     Supine and Reverse Trendelenburg Ultrasound of the Deep and Superficial Veins with Valsalva and Compression Augmentation Maneuvers. Duplex Imaging is performed utilizing gray-scale, Two-dimensional images, color-flow imaging, Doppler waveform analysis, and Spectral doppler imaging done with provacative maneuvers.      Incompetency Criteria:  Deep vein reflux reported when greater than 1000 msec flow reversal.  Superficial vein reflux reported when greater than 500 msec flow reversal.  vein reflux reported as greater than 350 msec flow reversal.      Right  Leg Deep Veins    CFV SFJ DFV PROX FV   PROX FV MID FV DIST POP V. PERON.   V. PTV'S   Compressibility  (FC,PC,NC) FC FC FC FC FC FC FC FC FC   Reflux -   - - - - -   -         Right Leg Superficial Veins  Location SFJ PROX THIGH MID THIGH KNEE MID CALF   GSV (mm) 5.5 4.7 4.8 5.6 2.5   Reflux + - - - -   AASV (mm) 2.8           Reflux  -           PASV (mm) 4.0            Reflux -              Location SPJ PROX CALF MID CALF   SSV (mm) 4.4 3.4 3.6   Reflux - - -      Left  Leg Deep Veins    CFV SFJ DFV PROX FV   PROX FV MID FV DIST POP V. PERON.   V. PTV'S   Compressibility  (FC,PC,NC) FC FC FC FC FC FC FC FC FC   Reflux -   - - - - -   -         Left Leg Superficial Veins  Location SFJ PROX THIGH MID THIGH KNEE MID CALF   GSV (mm) 4.6 4.8 4.7 4.5 3.3   Reflux - + - - -   AASV (mm) 1.2           Reflux  -           PASV (mm) 4.0            Reflux -              Location SPJ PROX CALF MID CALF   SSV (mm) 8.3 7.8 6.1   Reflux + + +      Comments: No incompetent  veins visualized.       Impression:       Right Deep Vein Findings: Patent deep venous system with no evidence of DVT and without reflux.      Left Deep Vein Findings: Patent deep venous system with no evidence of DVT and without reflux.     Superficial Vein Findings:      Right Greater Saphenous Vein: Patent Greater Saphenous vein with Reflux noted at SFJ with a Maximum diameter of 5.5mm.     Right Small Saphenous Vein: Patent Small Saphenous Vein without evidence of reflux.     Left Greater Saphenous Vein: Patent Greater Saphenous vein with Reflux noted in proximal thigh with a Maximum diameter of 4.8mm.     Left Small Saphenous Vein: Patent Small Saphenous vein with Reflux noted from SPJ to mid calf with a Maximum diameter of 8.3mm.     Perforating and Accessory Veins: no reflux noted      Reference:     Compressibility: FC= Fully compressible, PC= Partially compressible, NC= Non-compressible, NV= Not Visualized     Reflux: (+) Incompetent  (-) Competent, (NV) = Not Visualized     Interpretation criteria:          Duration of Retrograde flow (milliseconds)  Category Deep Veins Superficial Veins  veins   Competent < 1000ms < 500ms < 350ms   Incompetent > 1000ms > 500ms > 350ms              Assessment/Plan:    She has  incompetency and insufficiency of the Left  Short  saphenous vein.  Left short saphenous vein measures 8.3 mm at the junction. Deep systems are intact. No DVTs. Great candidate for endovenous  closure. We spent 20 today and discussed the procedure. The risks of anesthesia, infection, bleeding, clotting, DVTs, numbess at the insertion site dermatome and  the process and procedure were discussed. Answered all questions today. Will submit this to [employer] if needed for pre approval.Will set this up when approved. Discussed need to have a  and procedure woul take around 30 minutes with total time 2-3 hours. Also understands a small screening ultrasound 2-3 days out to rule out clot formation at the closed vessel.    DIAGNOSIS: Venous insufficiency of the  left short saphenous vein      PROCEDURE: Endovenous closure of the left short saphenous vein    Ulices Bragg MD  General Surgery [phone]  Vascular Surgery [phone]

## (undated) RX ORDER — DIPHENHYDRAMINE HYDROCHLORIDE 50 MG/ML
INJECTION INTRAMUSCULAR; INTRAVENOUS
Status: DISPENSED
Start: 2022-12-15

## (undated) RX ORDER — FENTANYL CITRATE 50 UG/ML
INJECTION, SOLUTION INTRAMUSCULAR; INTRAVENOUS
Status: DISPENSED
Start: 2022-12-15